# Patient Record
Sex: FEMALE | Race: WHITE | NOT HISPANIC OR LATINO | Employment: FULL TIME | URBAN - METROPOLITAN AREA
[De-identification: names, ages, dates, MRNs, and addresses within clinical notes are randomized per-mention and may not be internally consistent; named-entity substitution may affect disease eponyms.]

---

## 2021-12-01 ENCOUNTER — OFFICE VISIT (OUTPATIENT)
Dept: NEUROLOGY | Facility: CLINIC | Age: 51
End: 2021-12-01

## 2021-12-01 VITALS
TEMPERATURE: 97.3 F | RESPIRATION RATE: 14 BRPM | HEART RATE: 60 BPM | OXYGEN SATURATION: 99 % | WEIGHT: 180 LBS | BODY MASS INDEX: 27.28 KG/M2 | SYSTOLIC BLOOD PRESSURE: 130 MMHG | DIASTOLIC BLOOD PRESSURE: 72 MMHG | HEIGHT: 68 IN

## 2021-12-01 DIAGNOSIS — G43.709 CHRONIC MIGRAINE WITHOUT AURA WITHOUT STATUS MIGRAINOSUS, NOT INTRACTABLE: ICD-10-CM

## 2021-12-01 DIAGNOSIS — R42 DIZZINESS: Primary | ICD-10-CM

## 2021-12-01 PROBLEM — N83.292 COMPLEX CYST OF LEFT OVARY: Status: ACTIVE | Noted: 2017-09-11

## 2021-12-01 PROBLEM — E78.5 HYPERLIPIDEMIA: Status: ACTIVE | Noted: 2021-12-01

## 2021-12-01 PROBLEM — J30.2 SEASONAL ALLERGIES: Status: ACTIVE | Noted: 2021-06-15

## 2021-12-01 PROCEDURE — 99244 OFF/OP CNSLTJ NEW/EST MOD 40: CPT | Performed by: NURSE PRACTITIONER

## 2021-12-01 RX ORDER — SUMATRIPTAN 50 MG/1
50 TABLET, FILM COATED ORAL AS NEEDED
COMMUNITY
End: 2021-12-07 | Stop reason: ALTCHOICE

## 2021-12-01 RX ORDER — LORATADINE 10 MG/1
10 TABLET ORAL DAILY
COMMUNITY

## 2021-12-01 RX ORDER — FLUTICASONE PROPIONATE 50 MCG
2 SPRAY, SUSPENSION (ML) NASAL AS NEEDED
COMMUNITY

## 2021-12-01 RX ORDER — LISINOPRIL 20 MG/1
40 TABLET ORAL DAILY
COMMUNITY
End: 2022-10-04 | Stop reason: DRUGHIGH

## 2021-12-01 RX ORDER — MONTELUKAST SODIUM 10 MG/1
10 TABLET ORAL NIGHTLY
COMMUNITY

## 2021-12-01 RX ORDER — ATORVASTATIN CALCIUM 20 MG/1
20 TABLET, FILM COATED ORAL DAILY
COMMUNITY
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
## 2021-12-01 NOTE — ASSESSMENT & PLAN NOTE
Headaches are unchanged.  Medication changes per orders.     MRI Brain w/wo to R/O damage s/p trauma     CTA H/N due to dizziness and worsening of pain with exertion     Start Ubrelvy 100 mg PO PRN for migraine, may repeat once in 2 hours if needed.     Stop Sumatriptan, due to potential vascular cause of headache and HTN triptans contraindicated.     F/U in 6 weeks or sooner if needed

## 2021-12-06 ENCOUNTER — SPECIALTY PHARMACY (OUTPATIENT)
Dept: ONCOLOGY | Facility: HOSPITAL | Age: 51
End: 2021-12-06

## 2021-12-07 NOTE — PROGRESS NOTES
Specialty Pharmacy Refill Coordination Note     Hiral is a 51 y.o. female contacted today regarding refills of  Ubrelvy specialty medication(s).    Reviewed and verified with patient: Allergies  Meds  Problems       Specialty medication(s) and dose(s) confirmed: yes    Refill Questions      Most Recent Value   Changes to allergies? No   Changes to medications? No   New conditions since last clinic visit No   Unplanned office visit, urgent care, ED, or hospital admission in the last 4 weeks  No   How does patient/caregiver feel medication is working? Good   Financial problems or insurance changes  No   How many doses of your specialty medications were missed in the last 4 weeks? prn medication          Delivery Questions      Most Recent Value   Delivery method [employer]   Delivery address correct? Yes   Preferred delivery time? Anytime   Number of medications in delivery 1   Medication being filled and delivered Ubrelvy   Doses left of specialty medications Pt has one sample left   Is there any medication that is due not being filled? No   Supplies needed? No supplies needed   Cooler needed? No   Do any medications need mixed or dated? No   Copay form of payment Payment plan already set up   Questions or concerns for the pharmacist? No   Are any medications first time fills? No            Medication Adherence    Any gaps in refill history greater than 2 weeks in the last 3 months: no  Demonstrates understanding of importance of adherence: yes  Informant: patient  Reliability of informant: reliable  Provider-estimated medication adherence level: %   Other non-adherence reason: prn medication   Support network for adherence: healthcare provider          Follow-up: 28 day(s)     Lynda Gillette, PharmD  12/7/2021/  12:06 EST

## 2021-12-07 NOTE — PROGRESS NOTES
Specialty Pharmacy Patient Management Program  Neurology Initial Assessment       Hiral Easley is a 51 y.o. female with mirgaines seen by a Neurology provider and enrolled in the Neurology Patient Management program offered by [facility].  An initial outreach was conducted, including assessment of therapy appropriateness and specialty medication education for Ubrelvy. The patient was introduced to services offered by [facility], including: regular assessments, refill coordination, curbside pick-up or mail order delivery options, prior authorization maintenance, and financial assistance programs as applicable. The patient was also provided with contact information for the pharmacy team.     Insurance Coverage & Financial Support  [employer]    Relevant Past Medical History and Comorbidities  Relevant medical history and concomitant health conditions were discussed with the patient. The patient's chart has been reviewed for relevant past medical history and comorbid health conditions and updated as necessary.   Past Medical History:   Diagnosis Date   • Anxiety    • Dizziness    • Environmental allergies    • Hypertension    • Migraine    • Stomach problems      Social History     Socioeconomic History   • Marital status: Single   Tobacco Use   • Smoking status: Never Smoker   • Smokeless tobacco: Never Used   Vaping Use   • Vaping Use: Never used   Substance and Sexual Activity   • Alcohol use: Yes     Alcohol/week: 6.0 - 8.0 standard drinks     Types: 6 - 8 Glasses of wine per week       Allergies  Known allergies and reactions were discussed with the patient. The patient's chart has been reviewed for  allergy information and updated as necessary.   Patient has no known allergies.    Current Medication List  This medication list has been reviewed with the patient and evaluated for any interactions or necessary modifications/recommendations, and updated to  include all prescription medications, OTC medications, and supplements the patient is currently taking.  This list reflects what is contained in the patient's profile, which has also been marked as reviewed to communicate to other providers it is the most up to date version of the patient's current medication therapy.     Current Outpatient Medications:   •  atorvastatin (LIPITOR) 20 MG tablet, Take 20 mg by mouth Daily., Disp: , Rfl:   •  fluticasone (FLONASE) 50 MCG/ACT nasal spray, 2 sprays into the nostril(s) as directed by provider As Needed for Rhinitis., Disp: , Rfl:   •  lisinopril (PRINIVIL,ZESTRIL) 20 MG tablet, Take 20 mg by mouth Daily., Disp: , Rfl:   •  loratadine (Claritin) 10 MG tablet, Take 10 mg by mouth Daily., Disp: , Rfl:   •  montelukast (SINGULAIR) 10 MG tablet, Take 10 mg by mouth Every Night., Disp: , Rfl:   •  ubrogepant (ubrogepant) 100 MG tablet, Take 100 mg by mouth Daily As Needed., Disp: , Rfl:   •  SUMAtriptan (IMITREX) 50 MG tablet, Take 50 mg by mouth As Needed for Migraine. Take one tablet at onset of headache. May repeat dose one time in 2 hours if headache not relieved., Disp: , Rfl:     Drug Interactions  None noted    Recommended Medications Assessment: none      Relevant Laboratory Values    Labs monitoring not required    Initial Education Provided for Specialty Medication  The patient has been provided with the following education and any applicable administration techniques (i.e. self-injection) have been demonstrated for the therapies indicated. All questions and concerns have been addressed prior to the patient receiving the medication, and the patient has verbalized understanding of the education and any materials provided.  Additional patient education shall be provided and documented upon request by the patient, provider or payer.      Ubrelvy (Ubrogepant) 100mg daily prn migraines - may repeat once in 2 hours if needed.  Medication Expectations   Why am I taking this  medication? You are taking this medication to treat acute migraines.   What should I expect while on this medication? You should expect to see a decrease in the frequency and severity of your migraines.   How does the medication work? Ubrelvy is a monoclonal antibody that binds to calcitonin gene-related peptide (CGRP) and blocks its binding to the receptor decreasing the severity of migraines.   How long will I be on this medication for? The amount of time you will be on this medication will be determined by your doctor and your response to the medication.    How do I take this medication? Take as directed on your prescription label.  Reviewed plan for Ubrelvy 100mg (1 tablet) PO daily prn; may repeat x 1 in 2 hours, if needed. Max dosage = 200mg/24 hours.    What are some possible side effects? Potential side effects including, but not limited to nausea and somnolence. Pt verbalized understanding.   What happens if I miss a dose? This is taken as needed for migraines.     Medication Safety   What are things I should warn my doctor immediately about? Allergic reaction: Itching or hives, swelling in your face or hands, swelling or tingling in your mouth or throat, chest tightness, trouble breathing     What are things that I should be cautious of? Tell your doctor if you are pregnant or breastfeeding, or if you have kidney disease or liver disease.   What are some medications that can interact with this one? Concomitant use of strong CY inhibitors, check with your pharmacist or provider before starting any new medications, avoid grapefruit juice.     Medication Storage/Handling   How should I handle this medication? Keep this medication out of reach of pets/children.   How does this medication need to be stored? Store at a controlled room temperature between 20 and 25 degrees C (68 and 77 degrees F), with excursions permitted between 15 and 30 degrees C (59 and 86 degrees F) away from direct sunlight and  moisture.   How should I dispose of this medication? There should not be a need to dispose of this medication unless your provider decides to change the dose or therapy. If that is the case, take to your local police station for proper disposal. Some pharmacies also have take-back bins for medication drop-off.      Resources/Support   How can I remind myself to take this medication? This is taken as needed for migraines.   Is financial support available?  Yes, [employer] can provide co-pay cards if you have commercial insurance or patient assistance if you have Medicare or no insurance.    Which vaccines are recommended for me? Talk to your doctor about these vaccines: Flu, Coronavirus (COVID-19), Pneumococcal (pneumonia), Tdap, Hepatitis B, Zoster (shingles)          Adherence and Self-Administration  • Barriers to Patient Adherence and/or Self-Administration: none  • Methods for Supporting Patient Adherence and/or Self-Administration: none    Goals of Therapy  • Patient Goals of Therapy: Symptom management   • Clinical Goals or Therapeutic Targets, If Applicable: reduction of severity of migraines      Quality of Life Assessment   The patient's current (pre-therapy) quality of life was discussed with the patient. The QOL segment of this outreach has been reviewed and updated.   • Quality of Life Score: 7    Reassessment Plan & Follow-Up  1. Pharmacist to perform regular reassessments no more than (6) months from the previous assessment.  2. Care Coordinator to set up future refill outreaches, coordinate prescription delivery, and escalate clinical questions to pharmacist.     Additional Plans, Therapy Recommendations or Therapy Problems to Be Addressed:  Patient to stop taking sumatriptan due to potential vascular cause of headache and hypertension.  Recent Provider Changes:  New to our neurology group.    Attestation  I attest that the initiated specialty Ubrelvy appropriate for the patient based on  my assessment.  If the prescribed therapy is at any point deemed not appropriate based on the current or future assessments, a consultation will be initiated with the patient's specialty care provider to determine the best course of action. The revised plan of therapy will be documented along with any additional patient education provided.     Lynda Gillette, PharmD  12/7/2021  12:06 EST

## 2021-12-30 ENCOUNTER — HOSPITAL ENCOUNTER (OUTPATIENT)
Dept: MRI IMAGING | Facility: HOSPITAL | Age: 51
Discharge: HOME | End: 2021-12-30

## 2021-12-30 ENCOUNTER — HOSPITAL ENCOUNTER (OUTPATIENT)
Dept: CT IMAGING | Facility: HOSPITAL | Age: 51
Discharge: HOME | End: 2021-12-30

## 2021-12-30 ENCOUNTER — APPOINTMENT (OUTPATIENT)
Dept: CT IMAGING | Facility: HOSPITAL | Age: 51
End: 2021-12-30

## 2021-12-30 DIAGNOSIS — R42 DIZZINESS: ICD-10-CM

## 2021-12-30 DIAGNOSIS — G43.709 CHRONIC MIGRAINE WITHOUT AURA WITHOUT STATUS MIGRAINOSUS, NOT INTRACTABLE: ICD-10-CM

## 2021-12-30 LAB — CREAT BLDA-MCNC: 0.8 MG/DL (ref 0.6–1.3)

## 2021-12-30 PROCEDURE — 0 GADOBENATE DIMEGLUMINE 529 MG/ML SOLUTION: Performed by: NURSE PRACTITIONER

## 2021-12-30 PROCEDURE — 70496 CT ANGIOGRAPHY HEAD: CPT

## 2021-12-30 PROCEDURE — 0 IOPAMIDOL PER 1 ML: Performed by: NURSE PRACTITIONER

## 2021-12-30 PROCEDURE — 70553 MRI BRAIN STEM W/O & W/DYE: CPT

## 2021-12-30 PROCEDURE — A9577 INJ MULTIHANCE: HCPCS | Performed by: NURSE PRACTITIONER

## 2021-12-30 PROCEDURE — 70498 CT ANGIOGRAPHY NECK: CPT

## 2021-12-30 PROCEDURE — 82565 ASSAY OF CREATININE: CPT

## 2021-12-30 RX ADMIN — GADOBENATE DIMEGLUMINE 15 ML: 529 INJECTION, SOLUTION INTRAVENOUS at 09:57

## 2021-12-30 RX ADMIN — IOPAMIDOL 75 ML: 755 INJECTION, SOLUTION INTRAVENOUS at 09:03

## 2022-01-04 ENCOUNTER — SPECIALTY PHARMACY (OUTPATIENT)
Dept: ONCOLOGY | Facility: HOSPITAL | Age: 52
End: 2022-01-04

## 2022-01-04 ENCOUNTER — TELEPHONE (OUTPATIENT)
Dept: NEUROLOGY | Facility: CLINIC | Age: 52
End: 2022-01-04

## 2022-01-04 NOTE — TELEPHONE ENCOUNTER
----- Message from Anna Pretty DNP, APRN sent at 1/3/2022  5:02 PM EST -----  Please notify pt that MRI brain and CT head and neck are normal with no concerning findings.

## 2022-01-04 NOTE — TELEPHONE ENCOUNTER
Caller: TONIE Ramon call back number: [phone]    Who are you requesting to speak with (clinical staff, provider,  specific staff member): RIYA    What was the call regarding: PT IS RETURNING RIYA'S CALL    Do you require a callback: YES, PLEASE

## 2022-01-04 NOTE — PROGRESS NOTES
Specialty Pharmacy Refill Coordination Note     Hiral is a 51 y.o. female contacted today regarding refills of  Ubrelvy specialty medication(s). Patient did not need medication refilled at this time. Plan to f/u in 1 month.    Reviewed and verified with patient: Yes  Specialty medication(s) and dose(s) confirmed: yes              Medication Adherence    Support network for adherence: healthcare provider          Follow-up: Plan to reach out to patient around 2/3/2022 for a refill.     Catrina Mejia, Pharmacy Technician  Specialty Pharmacy Technician

## 2022-01-28 ENCOUNTER — SPECIALTY PHARMACY (OUTPATIENT)
Dept: ONCOLOGY | Facility: HOSPITAL | Age: 52
End: 2022-01-28

## 2022-02-10 ENCOUNTER — OFFICE VISIT (OUTPATIENT)
Dept: NEUROLOGY | Facility: CLINIC | Age: 52
End: 2022-02-10

## 2022-02-10 VITALS
HEIGHT: 68 IN | WEIGHT: 183 LBS | HEART RATE: 55 BPM | BODY MASS INDEX: 27.74 KG/M2 | OXYGEN SATURATION: 99 % | SYSTOLIC BLOOD PRESSURE: 126 MMHG | DIASTOLIC BLOOD PRESSURE: 78 MMHG

## 2022-02-10 DIAGNOSIS — G43.709 CHRONIC MIGRAINE WITHOUT AURA WITHOUT STATUS MIGRAINOSUS, NOT INTRACTABLE: Primary | Chronic | ICD-10-CM

## 2022-02-10 PROCEDURE — 99213 OFFICE O/P EST LOW 20 MIN: CPT | Performed by: NURSE PRACTITIONER

## 2022-02-10 NOTE — ASSESSMENT & PLAN NOTE
Headaches are improving with treatment.  Continue current treatment regimen.     Stable on Ubrelvy 100 mg PO PRN     F/U in 1 year or sooner if needed
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
## 2022-02-23 ENCOUNTER — SPECIALTY PHARMACY (OUTPATIENT)
Dept: ONCOLOGY | Facility: HOSPITAL | Age: 52
End: 2022-02-23

## 2022-02-23 NOTE — PROGRESS NOTES
Specialty Pharmacy Refill Coordination Note     Hiral is a 51 y.o. female contacted today regarding refills of  Qulipta specialty medication(s). Patient did not need refilled at this time.    Reviewed and verified with patient: Yes  Specialty medication(s) and dose(s) confirmed: yes             Follow-up: Plan to reach out to patient around 3/25 for a refill.     Catrina Mejia, Pharmacy Technician  Specialty Pharmacy Technician

## 2022-03-25 ENCOUNTER — SPECIALTY PHARMACY (OUTPATIENT)
Dept: ONCOLOGY | Facility: HOSPITAL | Age: 52
End: 2022-03-25

## 2022-03-25 NOTE — PROGRESS NOTES
Specialty Pharmacy Refill Coordination Note     Hiral is a 52 y.o. female contacted today regarding refills of  Ubrelvy specialty medication(s).   Patient did not need refilled at this time.    Reviewed and verified with patient:         Specialty medication(s) and dose(s) confirmed: yes             Follow-up: Plan to reach out to patient around 4/24 for a refill.       Catrina Mejia, Pharmacy Technician  Specialty Pharmacy Technician

## 2022-04-22 ENCOUNTER — SPECIALTY PHARMACY (OUTPATIENT)
Dept: ONCOLOGY | Facility: HOSPITAL | Age: 52
End: 2022-04-22

## 2022-04-22 NOTE — PROGRESS NOTES
Specialty Pharmacy Refill Coordination Note     Hiral is a 52 y.o. female contacted today regarding refills of  Ubrelvy specialty medication(s). Patient did not need refilled at this time.    Reviewed and verified with patient:         Specialty medication(s) and dose(s) confirmed: yes             Follow-up: 1 month(s)     Catrina Mejia, Pharmacy Technician  Specialty Pharmacy Technician

## 2022-04-25 ENCOUNTER — SPECIALTY PHARMACY (OUTPATIENT)
Dept: ONCOLOGY | Facility: HOSPITAL | Age: 52
End: 2022-04-25

## 2022-04-25 NOTE — PROGRESS NOTES
Specialty Pharmacy Patient Management Program  Neurology Reassessment     Hiral Easley is a 52 y.o. female with migraines seen by a Neurology provider and enrolled in the Neurology Patient Management program offered by [facility].  A follow-up outreach was conducted, including assessment of continued therapy appropriateness, medication adherence, and side effect incidence and management for  Ubrelvy.     Changes to Insurance Coverage or Financial Support  Rx [employer]     Relevant Past Medical History and Comorbidities  Relevant medical history and concomitant health conditions were discussed with the patient. The patient's chart has been reviewed for relevant past medical history and comorbid health conditions and updated as necessary.   Past Medical History:   Diagnosis Date   • Anxiety    • Dizziness    • Environmental allergies    • Hypertension    • Migraine    • Stomach problems      Social History     Socioeconomic History   • Marital status: Single   Tobacco Use   • Smoking status: Never Smoker   • Smokeless tobacco: Never Used   Vaping Use   • Vaping Use: Never used   Substance and Sexual Activity   • Alcohol use: Yes     Alcohol/week: 6.0 - 8.0 standard drinks     Types: 6 - 8 Glasses of wine per week       Problem list reviewed by Lynda Gillette, Cheryl on 4/25/2022 at 11:35 AM    Allergies  Known allergies and reactions were discussed with the patient. The patient's chart has been reviewed for allergy information and updated as necessary.   Patient has no known allergies.    Allergies reviewed by Lynda Gillette PharmD on 4/25/2022 at 11:35 AM    Relevant Laboratory Values    Common labs    Common Labsle 12/30/21   Creatinine 0.80      Comments are available for some flowsheets but are not being displayed.               Current Medication List  This medication list has been reviewed with the patient and evaluated for any interactions or necessary  modifications/recommendations, and updated to include all prescription medications, OTC medications, and supplements the patient is currently taking.  This list reflects what is contained in the patient's profile, which has also been marked as reviewed to communicate to other providers it is the most up to date version of the patient's current medication therapy.     Current Outpatient Medications:   •  atorvastatin (LIPITOR) 20 MG tablet, Take 20 mg by mouth Daily., Disp: , Rfl:   •  fluticasone (FLONASE) 50 MCG/ACT nasal spray, 2 sprays into the nostril(s) as directed by provider As Needed for Rhinitis., Disp: , Rfl:   •  lisinopril (PRINIVIL,ZESTRIL) 20 MG tablet, Take 20 mg by mouth Daily., Disp: , Rfl:   •  loratadine (CLARITIN) 10 MG tablet, Take 10 mg by mouth Daily., Disp: , Rfl:   •  montelukast (SINGULAIR) 10 MG tablet, Take 10 mg by mouth Every Night., Disp: , Rfl:   •  ubrogepant (ubrogepant) 100 MG tablet, Take 1 tablet by mouth Daily As Needed (migraines - may repeat x 1 in 2 hours (max of 200mg /24 hours))., Disp: 30 tablet, Rfl: 11    Medicines reviewed by Lynda Gillette, PharmD on 4/25/2022 at 11:35 AM    Drug Interactions  none     Adverse Drug Reactions  • Adverse Reactions Experienced: none   • Plan for ADR Management: not required    Hospitalizations and Urgent Care Since Last Assessment  • Hospitalizations or Admissions: none   • ED Visits: none  • Urgent Office Visits: none     Adherence and Self-Administration  Adherence related patient's specialty therapy was discussed with the patient. The Adherence segment of this outreach has been reviewed and updated.     Medication Adherence    Demonstrates understanding of importance of adherence: yes  Informant: patient  Reliability of informant: reliable  Estimated medication adherence level: %  Adherence estimation source: claims history  Reasons for non-adherence: no problems identified  Adherence tools used: directed education   Other  adherence tools: prn medication   Support network for adherence: healthcare provider        • Ongoing or New Barriers to Patient Adherence and/or Self-Administration: none   • Methods for Supporting Patient Adherence and/or Self-Administration: none     Goals of Therapy  Goals related to the patient's specialty therapy was discussed with the patient. The Patient Goals segment of this outreach has been reviewed and updated.    Goals     • Specialty Pharmacy General Goal      Reduction in  severity and length of acute migraine symptoms.  2/10/22 HA frequency has decreased to approximately 5 per month. Ubrelvy is abortive without side effects. Has not had to take a second dosage               Quality of Life Assessment   Quality of Life related to the patient's specialty therapy was discussed with the patient. The QOL segment of this outreach has been reviewed and updated.     Quality of Life Assessment  Quality of Life Improvement Scale: Moderately better    Reassessment Plan & Follow-Up  1. Medication Therapy Changes: none  2. Additional Plans, Therapy Recommendations, or Therapy Problems to Be Addressed: none  3. Pharmacist to perform regular reassessments no more than (6) months from the previous assessment.  4. Care Coordinator to set up future refill outreaches, coordinate prescription delivery, and escalate clinical questions to pharmacist.     Attestation  I attest that the specialty medication(s) addressed above are appropriate for the patient based on my reassessment.  If the prescribed therapy is at any point deemed not appropriate based on the current or future assessments, a consultation will be initiated with the patient's specialty care provider to determine the best course of action. The revised plan of therapy will be documented along with any additional patient education provided.     Lynda Gillette, PharmD  4/25/2022  11:41 EDT

## 2022-05-09 ENCOUNTER — TELEPHONE (OUTPATIENT)
Dept: NEUROLOGY | Facility: OTHER | Age: 52
End: 2022-05-09

## 2022-05-12 ENCOUNTER — SPECIALTY PHARMACY (OUTPATIENT)
Dept: ONCOLOGY | Facility: HOSPITAL | Age: 52
End: 2022-05-12

## 2022-05-12 NOTE — PROGRESS NOTES
Specialty Pharmacy Refill Coordination Note     Hiral is a 52 y.o. female contacted today regarding refills of  Ubrelvy specialty medication(s).    Reviewed and verified with patient:         Specialty medication(s) and dose(s) confirmed: yes    Refill Questions    Flowsheet Row Most Recent Value   Changes to allergies? No   Changes to medications? No   New conditions since last clinic visit No   Unplanned office visit, urgent care, ED, or hospital admission in the last 4 weeks  No   How does patient/caregiver feel medication is working? Excellent   Financial problems or insurance changes  No   Since the previous refill, were any specialty medication doses or scheduled injections missed or delayed?  No  [prn medication]   Does this patient require a clinical escalation to a pharmacist? No          Delivery Questions    Flowsheet Row Most Recent Value   Delivery method FedEx   Delivery address correct? Yes   Preferred delivery time? Anytime   Number of medications in delivery 1   Medication being filled and delivered Ubrelvy   Is there any medication that is due not being filled? No   Supplies needed? No supplies needed   Cooler needed? No   Do any medications need mixed or dated? No   Copay form of payment Payment plan already set up   Questions or concerns for the pharmacist? No   Are any medications first time fills? No            Medication Adherence    Adherence tools used: directed education   Other adherence tool: prn medication   Support network for adherence: healthcare provider          Follow-up: 1 month(s)     Catrina Mejia, Pharmacy Technician  Specialty Pharmacy Technician

## 2022-06-08 ENCOUNTER — SPECIALTY PHARMACY (OUTPATIENT)
Dept: ONCOLOGY | Facility: HOSPITAL | Age: 52
End: 2022-06-08

## 2022-06-08 NOTE — PROGRESS NOTES
Specialty Pharmacy Refill Coordination Note     Hiral is a 52 y.o. female contacted today regarding refills of  Ubrelvy specialty medication(s).    Reviewed and verified with patient:         Specialty medication(s) and dose(s) confirmed: yes    Refill Questions    Flowsheet Row Most Recent Value   Changes to allergies? No   Changes to medications? No   New conditions since last clinic visit No   Unplanned office visit, urgent care, ED, or hospital admission in the last 4 weeks  No   How does patient/caregiver feel medication is working? Excellent   Financial problems or insurance changes  No   If yes, describe changes in insurance or financial issues. N/A   Since the previous refill, were any specialty medication doses or scheduled injections missed or delayed?  No   If yes, please provide the amount N/A   Why were doses missed? N/A   Does this patient require a clinical escalation to a pharmacist? No          Delivery Questions    Flowsheet Row Most Recent Value   Delivery method FedEx   Delivery address correct? Yes   Preferred delivery time? Anytime   Number of medications in delivery 1   Medication being filled and delivered Ubrelvy   Doses left of specialty medications 0   Is there any medication that is due not being filled? No   Supplies needed? No supplies needed   Cooler needed? No   Do any medications need mixed or dated? No   Copay form of payment Payment plan already set up   Questions or concerns for the pharmacist? No   Are any medications first time fills? No            Medication Adherence    Adherence tools used: directed education   Other adherence tool: prn medication   Support network for adherence: healthcare provider          Follow-up: 1 month(s)     Catrina Mejia, Pharmacy Technician  Specialty Pharmacy Technician

## 2022-07-13 ENCOUNTER — SPECIALTY PHARMACY (OUTPATIENT)
Dept: ONCOLOGY | Facility: HOSPITAL | Age: 52
End: 2022-07-13

## 2022-07-13 RX ORDER — AMLODIPINE BESYLATE 5 MG/1
5 TABLET ORAL DAILY
COMMUNITY

## 2022-07-13 NOTE — PROGRESS NOTES
Specialty Pharmacy Refill Coordination Note     Hiral is a 52 y.o. female contacted today regarding refills of  Ubrelvy specialty medication(s).    Reviewed and verified with patient:         Specialty medication(s) and dose(s) confirmed: yes    Refill Questions    Flowsheet Row Most Recent Value   Changes to allergies? No   Changes to medications? Yes  [Patient has increased lisinopril from 20mg to 40mg daily. She has also started amlodipine 5mg daily.]   New conditions since last clinic visit No   Unplanned office visit, urgent care, ED, or hospital admission in the last 4 weeks  No   How does patient/caregiver feel medication is working? Fair  [Patient does not think the medication is working as well as it was. She is scheduling an appointment to be seen for possible changes.]   Financial problems or insurance changes  No   If yes, describe changes in insurance or financial issues. N/A   Since the previous refill, were any specialty medication doses or scheduled injections missed or delayed?  No  [prn medication]   If yes, please provide the amount N/A   Why were doses missed? N/A   Does this patient require a clinical escalation to a pharmacist? Yes          Delivery Questions    Flowsheet Row Most Recent Value   Delivery method FedEx   Delivery address correct? Yes   Preferred delivery time? Anytime   Number of medications in delivery 1   Medication being filled and delivered Ubrelvy   Is there any medication that is due not being filled? No   Supplies needed? No supplies needed   Cooler needed? No   Do any medications need mixed or dated? No   Copay form of payment Payment plan already set up   Questions or concerns for the pharmacist? No   Are any medications first time fills? No            Medication Adherence    Adherence tools used: directed education   Other adherence tool: prn medication   Support network for adherence: healthcare provider          Follow-up: 1 month(s)     Catrina Mejia, Pharmacy  Technician  Specialty Pharmacy Technician

## 2022-07-13 NOTE — PROGRESS NOTES
Refill outreach escalated to pharmacist:  Patient has increased lisinopril 20mg to 40mg daily, and started amlodipine 5mg  po daily. Medications added to patient's profile and DUR ran - ok to proceed with Ubrelvy refill.  She does not think the Ubrelvy is not as effective as when she first started.   Provider notified and wants patient to schedule an appointment for possible medication change/medication addition.  F/U in 1 week to see if provider appointment scheduled.  Lynda Gillette, PharmD.  7/13/2022

## 2022-08-08 ENCOUNTER — SPECIALTY PHARMACY (OUTPATIENT)
Dept: ONCOLOGY | Facility: HOSPITAL | Age: 52
End: 2022-08-08

## 2022-08-08 NOTE — PROGRESS NOTES
Specialty Pharmacy Refill Coordination Note     Hiral is a 52 y.o. female contacted today regarding refills of Ubrelvy specialty medication(s).    Reviewed and verified with patient: Yes  Specialty medication(s) and dose(s) confirmed: yes    Refill Questions    Flowsheet Row Most Recent Value   Changes to allergies? No   Changes to medications? No   New conditions since last clinic visit No   Unplanned office visit, urgent care, ED, or hospital admission in the last 4 weeks  No   How does patient/caregiver feel medication is working? Good  [Patient still having several headaches a month.]   Financial problems or insurance changes  No   If yes, describe changes in insurance or financial issues. N/A   Since the previous refill, were any specialty medication doses or scheduled injections missed or delayed?  No   If yes, please provide the amount N/A   Why were doses missed? N/A   Does this patient require a clinical escalation to a pharmacist? No          Delivery Questions    Flowsheet Row Most Recent Value   Delivery method FedEx   Delivery address correct? Yes   Preferred delivery time? Anytime   Number of medications in delivery 1   Medication being filled and delivered Ubrelvy   Doses left of specialty medications About 7 tablets. PRN medication.   Is there any medication that is due not being filled? No   Supplies needed? No supplies needed   Cooler needed? No   Do any medications need mixed or dated? No   Copay form of payment Credit card on file   Questions or concerns for the pharmacist? No   Are any medications first time fills? No            Medication Adherence    Adherence tools used: directed education   Other adherence tool: prn medication   Support network for adherence: healthcare provider          Follow-up:  28 days.     Maria Luisa Villa, Pharmacy Technician  Specialty Pharmacy Technician

## 2022-08-25 ENCOUNTER — DOCUMENTATION (OUTPATIENT)
Dept: ONCOLOGY | Facility: HOSPITAL | Age: 52
End: 2022-08-25

## 2022-09-09 ENCOUNTER — SPECIALTY PHARMACY (OUTPATIENT)
Dept: ONCOLOGY | Facility: HOSPITAL | Age: 52
End: 2022-09-09

## 2022-09-09 NOTE — PROGRESS NOTES
Specialty Pharmacy Refill Coordination Note     Hiral is a 52 y.o. female contacted today regarding refills of Ubrelvy specialty medication(s).    Reviewed and verified with patient: Yes  Specialty medication(s) and dose(s) confirmed: yes    Refill Questions    Flowsheet Row Most Recent Value   Changes to allergies? No   Changes to medications? No   New conditions since last clinic visit No   Unplanned office visit, urgent care, ED, or hospital admission in the last 4 weeks  No   How does patient/caregiver feel medication is working? Very good   Financial problems or insurance changes  No   If yes, describe changes in insurance or financial issues. N/A   Since the previous refill, were any specialty medication doses or scheduled injections missed or delayed?  No   If yes, please provide the amount N/A   Why were doses missed? N/A   Does this patient require a clinical escalation to a pharmacist? No          Delivery Questions    Flowsheet Row Most Recent Value   Delivery method FedEx   Delivery address correct? Yes   Preferred delivery time? Anytime   Number of medications in delivery 1   Medication being filled and delivered Ubrelvy   Doses left of specialty medications About 5 tablets. PRN medication.   Is there any medication that is due not being filled? No   Supplies needed? No supplies needed   Cooler needed? No   Do any medications need mixed or dated? No   Copay form of payment Credit card on file   Questions or concerns for the pharmacist? No   Are any medications first time fills? No            Medication Adherence    Adherence tools used: directed education   Other adherence tool: prn medication   Support network for adherence: healthcare provider          Follow-up:  28 days     Maria Luisa Villa, Pharmacy Technician  Specialty Pharmacy Technician

## 2022-10-03 ENCOUNTER — SPECIALTY PHARMACY (OUTPATIENT)
Dept: ONCOLOGY | Facility: HOSPITAL | Age: 52
End: 2022-10-03

## 2022-10-03 NOTE — PROGRESS NOTES
Spoke with patient on 10/3 and she feels there have been times recently that Ubrelvy hasn't aborted migraines as effectively as it had in the past. Hold Ubrelvy refill at this time. She has an appt to see Sheng tomorrow morning and we will plan to see her in person to discuss after her appt with the provider.    Marilee Alaniz, Cheryl, MPA, BCPS, MSCS  10/3/2022   15:38 EDT

## 2022-10-04 ENCOUNTER — OFFICE VISIT (OUTPATIENT)
Dept: NEUROLOGY | Facility: CLINIC | Age: 52
End: 2022-10-04

## 2022-10-04 VITALS
OXYGEN SATURATION: 99 % | TEMPERATURE: 97.3 F | SYSTOLIC BLOOD PRESSURE: 106 MMHG | WEIGHT: 178.6 LBS | HEIGHT: 68 IN | HEART RATE: 61 BPM | BODY MASS INDEX: 27.07 KG/M2 | DIASTOLIC BLOOD PRESSURE: 82 MMHG

## 2022-10-04 DIAGNOSIS — I10 PRIMARY HYPERTENSION: ICD-10-CM

## 2022-10-04 DIAGNOSIS — G43.C0 PERIODIC HEADACHE SYNDROME, NOT INTRACTABLE: Primary | ICD-10-CM

## 2022-10-04 PROCEDURE — 99214 OFFICE O/P EST MOD 30 MIN: CPT | Performed by: NURSE PRACTITIONER

## 2022-10-04 RX ORDER — OXCARBAZEPINE 150 MG/1
150 TABLET, FILM COATED ORAL 2 TIMES DAILY
Qty: 60 TABLET | Refills: 2 | Status: SHIPPED | OUTPATIENT
Start: 2022-10-04 | End: 2023-01-04 | Stop reason: SDUPTHER

## 2022-10-04 RX ORDER — LISINOPRIL 40 MG/1
40 TABLET ORAL DAILY
COMMUNITY
Start: 2022-09-09

## 2022-10-04 RX ORDER — DICYCLOMINE HCL 20 MG
TABLET ORAL
COMMUNITY
Start: 2022-05-09

## 2022-10-04 RX ORDER — LEVOCETIRIZINE DIHYDROCHLORIDE 5 MG/1
5 TABLET, FILM COATED ORAL EVERY EVENING
COMMUNITY
End: 2023-01-04

## 2022-10-04 RX ORDER — ONDANSETRON 4 MG/1
4 TABLET, FILM COATED ORAL 3 TIMES DAILY
COMMUNITY
Start: 2022-06-29 | End: 2023-01-04

## 2022-10-04 NOTE — PROGRESS NOTES
Neuro Office Visit      Encounter Date: 10/04/2022   Patient Name: Hiral Easley  : 1970   MRN: [id number]     Chief Complaint:    Chief Complaint   Patient presents with   • Migraine       History of Present Illness: Hiral Easley is a 52 y.o. female who is here today in Neurology for headaches    Interval History  Seen in ED [facility] this summer for headache and elevated blood pressure. Treated migraine but did not treat bp.     HA  Current headaches are same as previous headaches.  Freq: can go weeks without a headache and then will have several in a row  Days per month  Location: retro-orbital and right temple  Quality: dull ache  Duration: Ubrelvy is effective in 30 min  Severity:3/10  Triggers: stress, onion  Assoc sx: Nausea, vomiting, photophobia/phonophobia, dizziness, no tinnitus  Aura:tingling and ear pain  Visual Changes    Abortives: Ubrelvy, imitrex, sumatriptan, tylenol, excedrin, ibuprofen, toradol  Preventives:none    HTN  BP now stable on norvasc and lisinopril. Compliant with meds. No side effects.    MRI Brain With & Without Contrast (2021 09:56)      PMH: ocular migraines.   Subjective      Past Medical History:   Past Medical History:   Diagnosis Date   • Anxiety    • Dizziness    • Environmental allergies    • Hypertension    • Migraine    • Stomach problems        Past Surgical History:   Past Surgical History:   Procedure Laterality Date   • GALLBLADDER SURGERY     • RETINAL DETACHMENT SURGERY         Family History:   Family History   Problem Relation Age of Onset   • Hypertension Mother    • Hyperlipidemia Mother    • Migraines Sister    • Hypertension Sister    • Hyperlipidemia Sister        Social History:   Social History     Socioeconomic History   • Marital status: Single   Tobacco Use   • Smoking status: Never Smoker   • Smokeless tobacco: Never Used   Vaping Use   • Vaping Use: Never used   Substance and Sexual Activity   • Alcohol use: Yes      Alcohol/week: 6.0 - 8.0 standard drinks     Types: 6 - 8 Glasses of wine per week       Medications:     Current Outpatient Medications:   •  amLODIPine (NORVASC) 5 MG tablet, Take 5 mg by mouth Daily., Disp: , Rfl:   •  atorvastatin (LIPITOR) 20 MG tablet, Take 20 mg by mouth Daily., Disp: , Rfl:   •  dicyclomine (BENTYL) 20 MG tablet, TAKE 1 TABLET BY MOUTH 4 TIMES DAILY AS NEEDED, Disp: , Rfl:   •  fluticasone (FLONASE) 50 MCG/ACT nasal spray, 2 sprays into the nostril(s) as directed by provider As Needed for Rhinitis., Disp: , Rfl:   •  levocetirizine (XYZAL) 5 MG tablet, Take 5 mg by mouth Every Evening., Disp: , Rfl:   •  loratadine (CLARITIN) 10 MG tablet, Take 10 mg by mouth Daily., Disp: , Rfl:   •  montelukast (SINGULAIR) 10 MG tablet, Take 10 mg by mouth Every Night., Disp: , Rfl:   •  ondansetron (ZOFRAN) 4 MG tablet, Take 4 mg by mouth 3 (Three) Times a Day., Disp: , Rfl:   •  ubrogepant (ubrogepant) 100 MG tablet, Take 1 tablet by mouth Daily As Needed (migraines - may repeat x 1 in 2 hours (max of 200mg /24 hours))., Disp: 30 tablet, Rfl: 11  •  lisinopril (PRINIVIL,ZESTRIL) 40 MG tablet, Take 40 mg by mouth Daily., Disp: , Rfl:   •  OXcarbazepine (TRILEPTAL) 150 MG tablet, Take 1 tablet by mouth 2 (Two) Times a Day., Disp: 60 tablet, Rfl: 2    Allergies:   No Known Allergies    PHQ-9 Total Score:     STEADI Fall Risk Assessment has not been completed.    Objective     Physical Exam:   Physical Exam  Eyes:      Pupils: Pupils are equal, round, and reactive to light.   Neurological:      Mental Status: She is oriented to person, place, and time.      Gait: Gait is intact.      Deep Tendon Reflexes:      Reflex Scores:       Tricep reflexes are 2+ on the right side and 2+ on the left side.       Bicep reflexes are 2+ on the right side and 2+ on the left side.       Brachioradialis reflexes are 2+ on the right side and 2+ on the left side.       Patellar reflexes are 2+ on the right side and 2+ on the  "left side.       Achilles reflexes are 2+ on the right side and 2+ on the left side.  Psychiatric:         Speech: Speech normal.         Neurologic Exam     Mental Status   Oriented to person, place, and time.   Follows 3 step commands.   Attention: normal. Concentration: normal.   Speech: speech is normal   Level of consciousness: alert  Knowledge: consistent with education.   Normal comprehension.     Cranial Nerves     CN III, IV, VI   Pupils are equal, round, and reactive to light.  Right pupil: Accommodation: intact.   Left pupil: Accommodation: intact.   CN III: no CN III palsy  CN VI: no CN VI palsy  Nystagmus: none   Diplopia: none  Upgaze: normal  Downgaze: normal  Conjugate gaze: present    CN VII   Facial expression full, symmetric.     CN VIII   Hearing: intact    CN XII   CN XII normal.     Motor Exam   Muscle bulk: normal  Overall muscle tone: normal    Strength   Right biceps: 5/5  Left biceps: 5/5  Right triceps: 5/5  Left triceps: 5/5  Right interossei: 5/5  Left interossei: 5/5  Right quadriceps: 5/5  Left quadriceps: 5/5  Right anterior tibial: 5/5  Left anterior tibial: 5/5  Right posterior tibial: 5/5  Left posterior tibial: 5/5    Sensory Exam   Light touch normal.     Gait, Coordination, and Reflexes     Gait  Gait: normal    Tremor   Resting tremor: absent  Action tremor: absent    Reflexes   Right brachioradialis: 2+  Left brachioradialis: 2+  Right biceps: 2+  Left biceps: 2+  Right triceps: 2+  Left triceps: 2+  Right patellar: 2+  Left patellar: 2+  Right achilles: 2+  Left achilles: 2+  Right : 2+  Left : 2+       Vital Signs:   Vitals:    10/04/22 1126   BP: 106/82   Pulse: 61   Temp: 97.3 °F (36.3 °C)   SpO2: 99%   Weight: 81 kg (178 lb 9.6 oz)   Height: 172.7 cm (67.99\")     Body mass index is 27.16 kg/m².         Assessment / Plan      Assessment/Plan:   Diagnoses and all orders for this visit:    1. Periodic headache syndrome, not intractable (Primary)  Comments:  Cont " Ubrelvy prn. Will need sodium labs.   Orders:  -     OXcarbazepine (TRILEPTAL) 150 MG tablet; Take 1 tablet by mouth 2 (Two) Times a Day.  Dispense: 60 tablet; Refill: 2    2. Primary hypertension  Comments:  Monitor bp at home. Cont lisinopril and norvasc.           Patient Education:     Reviewed medications, potential side effects and signs and symptoms to report. Discussed risk versus benefits of treatment plan with patient and/or family-including medications, labs and radiology that may be ordered. Addressed questions and concerns during visit. Patient and/or family verbalized understanding and agree with plan. Instructed to call the office with any questions and report to ER with any life-threatening symptoms.     Follow Up:   Return in about 3 months (around 1/4/2023).    During this visit the following were done:  Labs Reviewed []    Labs Ordered []    Radiology Reports Reviewed []    Radiology Ordered []    PCP Records Reviewed []    Referring Provider Records Reviewed []    ER Records Reviewed []    Hospital Records Reviewed []    History Obtained From Family []    Radiology Images Reviewed [x]    Other Reviewed [x]    Records Requested []      Anna Pretty, DNP, APRN

## 2022-10-05 ENCOUNTER — SPECIALTY PHARMACY (OUTPATIENT)
Dept: ONCOLOGY | Facility: HOSPITAL | Age: 52
End: 2022-10-05

## 2022-10-05 NOTE — PROGRESS NOTES
Specialty Pharmacy Patient Management Program  Neurology Reassessment     Hiral Easley is a 52 y.o. female with migraines seen by a Neurology provider and enrolled in the Neurology Patient Management program offered by [facility].  A follow-up outreach was conducted, including assessment of continued therapy appropriateness, medication adherence, and side effect incidence and management for  Ubrelvy.     Changes to Insurance Coverage or Financial Support  Rx [employer], Ubrelvy co-pay card.     Relevant Past Medical History and Comorbidities  Relevant medical history and concomitant health conditions were discussed with the patient. The patient's chart has been reviewed for relevant past medical history and comorbid health conditions and updated as necessary.   Past Medical History:   Diagnosis Date   • Anxiety    • Dizziness    • Environmental allergies    • Hypertension    • Migraine    • Stomach problems      Social History     Socioeconomic History   • Marital status: Single   Tobacco Use   • Smoking status: Never Smoker   • Smokeless tobacco: Never Used   Vaping Use   • Vaping Use: Never used   Substance and Sexual Activity   • Alcohol use: Yes     Alcohol/week: 6.0 - 8.0 standard drinks     Types: 6 - 8 Glasses of wine per week       Problem list reviewed by Lynda Gillette, Cheryl on 10/5/2022 at  9:38 AM    Allergies  Known allergies and reactions were discussed with the patient. The patient's chart has been reviewed for allergy information and updated as necessary.   Patient has no known allergies.    Allergies reviewed by Lynda Gillette, Cheryl on 10/5/2022 at  9:37 AM    Relevant Laboratory Values    Common labs    Common Labs 12/30/21   Creatinine 0.80      Comments are available for some flowsheets but are not being displayed.               Current Medication List  This medication list has been reviewed with the patient and evaluated for any interactions or necessary  modifications/recommendations, and updated to include all prescription medications, OTC medications, and supplements the patient is currently taking.  This list reflects what is contained in the patient's profile, which has also been marked as reviewed to communicate to other providers it is the most up to date version of the patient's current medication therapy.     Current Outpatient Medications:   •  amLODIPine (NORVASC) 5 MG tablet, Take 5 mg by mouth Daily., Disp: , Rfl:   •  atorvastatin (LIPITOR) 20 MG tablet, Take 20 mg by mouth Daily., Disp: , Rfl:   •  dicyclomine (BENTYL) 20 MG tablet, TAKE 1 TABLET BY MOUTH 4 TIMES DAILY AS NEEDED, Disp: , Rfl:   •  fluticasone (FLONASE) 50 MCG/ACT nasal spray, 2 sprays into the nostril(s) as directed by provider As Needed for Rhinitis., Disp: , Rfl:   •  levocetirizine (XYZAL) 5 MG tablet, Take 5 mg by mouth Every Evening., Disp: , Rfl:   •  lisinopril (PRINIVIL,ZESTRIL) 40 MG tablet, Take 40 mg by mouth Daily., Disp: , Rfl:   •  loratadine (CLARITIN) 10 MG tablet, Take 10 mg by mouth Daily., Disp: , Rfl:   •  montelukast (SINGULAIR) 10 MG tablet, Take 10 mg by mouth Every Night., Disp: , Rfl:   •  ondansetron (ZOFRAN) 4 MG tablet, Take 4 mg by mouth 3 (Three) Times a Day., Disp: , Rfl:   •  OXcarbazepine (TRILEPTAL) 150 MG tablet, Take 1 tablet by mouth 2 (Two) Times a Day., Disp: 60 tablet, Rfl: 2  •  ubrogepant (ubrogepant) 100 MG tablet, Take 1 tablet by mouth Daily As Needed (migraines - may repeat x 1 in 2 hours (max of 200mg /24 hours))., Disp: 30 tablet, Rfl: 11    Medicines reviewed by Lynda Gillette, PharmD on 10/5/2022 at  9:38 AM    Drug Interactions  none     Adverse Drug Reactions  • Adverse Reactions Experienced: none   • Plan for ADR Management: not required    Hospitalizations and Urgent Care Since Last Assessment  • Hospitalizations or Admissions: none   • ED Visits: none  • Urgent Office Visits: none     Adherence and Self-Administration  Adherence  related patient's specialty therapy was discussed with the patient. The Adherence segment of this outreach has been reviewed and updated.     Medication Adherence    Adherence tools used: directed education   Other adherence tools: prn medication   Support network for adherence: healthcare provider        • Ongoing or New Barriers to Patient Adherence and/or Self-Administration: none   • Methods for Supporting Patient Adherence and/or Self-Administration: nond     Goals of Therapy  Goals related to the patient's specialty therapy was discussed with the patient. The Patient Goals segment of this outreach has been reviewed and updated.    Goals     • Specialty Pharmacy General Goal      Reduction in  severity and length of acute migraine symptoms.  2/10/22 HA frequency has decreased to approximately 5 per month. Ubrelvy is abortive without side effects. Has not had to take a second dosage             Quality of Life Assessment   Quality of Life related to the patient's specialty therapy was discussed with the patient. The QOL segment of this outreach has been reviewed and updated.     Quality of Life Assessment  Quality of Life Improvement Scale: Somewhat better  Comments on Quality of Life: Usually does not have to repeat 2nd dose, but at times does it does not abort.    Reassessment Plan & Follow-Up  1. Medication Therapy Changes: Start oxcarbazepine 150 mg po bid.  2. Additional Plans, Therapy Recommendations, or Therapy Problems to Be Addressed: none  3. Pharmacist to perform regular reassessments no more than (6) months from the previous assessment.  4. Care Coordinator to set up future refill outreaches, coordinate prescription delivery, and escalate clinical questions to pharmacist.     Attestation  I attest that the specialty medication(s) addressed above are appropriate for the patient based on my reassessment.  If the prescribed therapy is at any point deemed not appropriate based on the current or future  assessments, a consultation will be initiated with the patient's specialty care provider to determine the best course of action. The revised plan of therapy will be documented along with any additional patient education provided.     Lynda Gillette, PharmD  10/5/2022  09:51 EDT

## 2022-10-05 NOTE — PROGRESS NOTES
Specialty Pharmacy Refill Coordination Note     Hiral is a 52 y.o. female contacted today regarding refills of  Ubrelvy specialty medication(s).    Plan to ship on 10/10/22 for delivery on 10/11/22.    Reviewed and verified with patient:  Allergies  Meds  Problems       Specialty medication(s) and dose(s) confirmed: yes    Refill Questions    Flowsheet Row Most Recent Value   Changes to allergies? No   Changes to medications? No   New conditions since last clinic visit No   Unplanned office visit, urgent care, ED, or hospital admission in the last 4 weeks  No   How does patient/caregiver feel medication is working? Good   Financial problems or insurance changes  No   If yes, describe changes in insurance or financial issues. n/a   Since the previous refill, were any specialty medication doses or scheduled injections missed or delayed?  No   If yes, please provide the amount n/a   Why were doses missed? n/a   Does this patient require a clinical escalation to a pharmacist? No          Delivery Questions    Flowsheet Row Most Recent Value   Delivery method FedEx   Delivery address correct? Yes   Preferred delivery time? Anytime   Number of medications in delivery 1   Medication being filled and delivered Ubrelvy   Doses left of specialty medications 4 doses   Is there any medication that is due not being filled? No   Supplies needed? No supplies needed   Cooler needed? No   Do any medications need mixed or dated? No   Copay form of payment Payment plan already set up   Questions or concerns for the pharmacist? No   Are any medications first time fills? No            Medication Adherence    Adherence tools used: directed education   Other adherence tool: prn medication   Support network for adherence: healthcare provider          Follow-up: 28 day(s)     Lynda Gillette, PharmD  10/5/2022

## 2022-11-11 ENCOUNTER — DOCUMENTATION (OUTPATIENT)
Dept: ONCOLOGY | Facility: HOSPITAL | Age: 52
End: 2022-11-11

## 2022-11-11 NOTE — PROGRESS NOTES
Specialty Pharmacy Refill Coordination Note     Hiral is a 52 y.o. female contacted today regarding refills of  Ubrelvy specialty medication(s). Patient did not need refilled at this time.    Reviewed and verified with patient:       Specialty medication(s) and dose(s) confirmed: n/a              Medication Adherence    Adherence tools used: directed education   Other adherence tool: prn medication   Support network for adherence: healthcare provider          Follow-up: 1 month(s)     Catrina Mejia, Pharmacy Technician  Specialty Pharmacy Technician

## 2023-01-04 ENCOUNTER — OFFICE VISIT (OUTPATIENT)
Dept: NEUROLOGY | Facility: CLINIC | Age: 53
End: 2023-01-04
Payer: COMMERCIAL

## 2023-01-04 VITALS
SYSTOLIC BLOOD PRESSURE: 102 MMHG | OXYGEN SATURATION: 98 % | DIASTOLIC BLOOD PRESSURE: 70 MMHG | BODY MASS INDEX: 29.19 KG/M2 | HEART RATE: 65 BPM | HEIGHT: 68 IN | WEIGHT: 192.6 LBS | TEMPERATURE: 97.7 F

## 2023-01-04 DIAGNOSIS — G43.C0 PERIODIC HEADACHE SYNDROME, NOT INTRACTABLE: Primary | ICD-10-CM

## 2023-01-04 DIAGNOSIS — I10 PRIMARY HYPERTENSION: ICD-10-CM

## 2023-01-04 PROCEDURE — 99214 OFFICE O/P EST MOD 30 MIN: CPT | Performed by: NURSE PRACTITIONER

## 2023-01-04 RX ORDER — OXCARBAZEPINE 150 MG/1
150 TABLET, FILM COATED ORAL 2 TIMES DAILY
Qty: 60 TABLET | Refills: 11 | Status: SHIPPED | OUTPATIENT
Start: 2023-01-04

## 2023-01-04 NOTE — PROGRESS NOTES
Neuro Office Visit      Encounter Date: 2023   Patient Name: Hiral Easley  : 1970   MRN: [id number]     Chief Complaint:    Chief Complaint   Patient presents with   • Headache       History of Present Illness: Hiral Easley is a 52 y.o. female who is here today in Neurology for migraines.    Last visit 10/4/2022 w me-cont ubrelvy, oxc, monitor bp at home.      HA  Ubrelvy is effective in 30-60 min with no side effects  Freq: can go weeks without a headache. 4 headaches in last 3 months  Location: retro-orbital and right temple  Quality: dull ache  Duration: Ubrelvy is effective in 30 min  Severity:3/10  Triggers: stress, onion  Assoc sx: Nausea, vomiting, photophobia/phonophobia, dizziness, no tinnitus  Aura:tingling and ear pain  Visual Changes    Abortives: Ubrelvy, imitrex, sumatriptan, tylenol, excedrin, ibuprofen, toradol  Preventives:none     Medical records release PCP to send future labs for sodium    HTN  Taking amlodipine, lisinopril.    PMH: htn, ocular migraines      Subjective      Past Medical History:   Past Medical History:   Diagnosis Date   • Anxiety    • Dizziness    • Environmental allergies    • Hyperlipidemia    • Hypertension    • Migraine    • Stomach problems        Past Surgical History:   Past Surgical History:   Procedure Laterality Date   • GALLBLADDER SURGERY     • RETINAL DETACHMENT SURGERY     • VASCULAR SURGERY         Family History:   Family History   Problem Relation Age of Onset   • Hypertension Mother    • Hyperlipidemia Mother    • Migraines Sister    • Hypertension Sister    • Hyperlipidemia Sister    • Seizures Maternal Aunt        Social History:   Social History     Socioeconomic History   • Marital status: Single   Tobacco Use   • Smoking status: Never   • Smokeless tobacco: Never   Vaping Use   • Vaping Use: Never used   Substance and Sexual Activity   • Alcohol use: Yes     Alcohol/week: 4.0 - 6.0 standard drinks     Types: 2 - 3 Glasses  of wine, 2 - 3 Cans of beer per week   • Drug use: Never   • Sexual activity: Yes     Partners: Male       Medications:     Current Outpatient Medications:   •  amLODIPine (NORVASC) 5 MG tablet, Take 5 mg by mouth Daily., Disp: , Rfl:   •  atorvastatin (LIPITOR) 20 MG tablet, Take 20 mg by mouth Daily., Disp: , Rfl:   •  dicyclomine (BENTYL) 20 MG tablet, TAKE 1 TABLET BY MOUTH 4 TIMES DAILY AS NEEDED, Disp: , Rfl:   •  fluticasone (FLONASE) 50 MCG/ACT nasal spray, 2 sprays into the nostril(s) as directed by provider As Needed for Rhinitis., Disp: , Rfl:   •  lisinopril (PRINIVIL,ZESTRIL) 40 MG tablet, Take 40 mg by mouth Daily., Disp: , Rfl:   •  loratadine (CLARITIN) 10 MG tablet, Take 10 mg by mouth Daily., Disp: , Rfl:   •  montelukast (SINGULAIR) 10 MG tablet, Take 10 mg by mouth Every Night., Disp: , Rfl:   •  OXcarbazepine (TRILEPTAL) 150 MG tablet, Take 1 tablet by mouth 2 (Two) Times a Day., Disp: 60 tablet, Rfl: 11  •  ubrogepant 100 MG tablet, Take 1 tablet by mouth Daily As Needed (migraines - may repeat x 1 in 2 hours (max of 200mg /24 hours))., Disp: 30 tablet, Rfl: 11    Allergies:   No Known Allergies    PHQ-9 Total Score:     STEADI Fall Risk Assessment has not been completed.    Objective     Physical Exam:   Physical Exam  Vitals and nursing note reviewed.   Constitutional:       General: She is not in acute distress.     Appearance: She is well-developed.   HENT:      Head: Normocephalic and atraumatic.      Right Ear: External ear normal.      Left Ear: External ear normal.      Nose: Nose normal.   Eyes:      General: No scleral icterus.        Right eye: No discharge.         Left eye: No discharge.      Conjunctiva/sclera: Conjunctivae normal.   Cardiovascular:      Rate and Rhythm: Normal rate.   Pulmonary:      Effort: Pulmonary effort is normal.   Musculoskeletal:         General: No deformity.      Cervical back: Neck supple.   Skin:     General: Skin is warm and dry.      Findings: No  rash.   Neurological:      General: No focal deficit present.      Mental Status: She is alert and oriented to person, place, and time. Mental status is at baseline.      Cranial Nerves: No cranial nerve deficit.      Sensory: No sensory deficit.      Motor: No weakness or abnormal muscle tone.      Gait: Gait normal.   Psychiatric:         Mood and Affect: Mood normal.         Behavior: Behavior normal.         Thought Content: Thought content normal.         Judgment: Judgment normal.         Neurologic Exam     Mental Status   Oriented to person, place, and time.        Vital Signs:   Vitals:    01/04/23 1529   BP: 102/70   Pulse: 65   Temp: 97.7 °F (36.5 °C)   SpO2: 98%   Weight: 87.4 kg (192 lb 9.6 oz)   Height: 172.7 cm (67.99\")     Body mass index is 29.29 kg/m².       Assessment / Plan      Assessment/Plan:   Diagnoses and all orders for this visit:    1. Periodic headache syndrome, not intractable (Primary)  Comments:  Cont Ubrelvy prn. Will need sodium labs.   Orders:  -     OXcarbazepine (TRILEPTAL) 150 MG tablet; Take 1 tablet by mouth 2 (Two) Times a Day.  Dispense: 60 tablet; Refill: 11    2. Primary hypertension  Comments:  Stable on current meds       Pt to have PCP fax labs to us.      Patient Education:     Reviewed medications, potential side effects and signs and symptoms to report. Discussed risk versus benefits of treatment plan with patient and/or family-including medications, labs and radiology that may be ordered. Addressed questions and concerns during visit. Patient and/or family verbalized understanding and agree with plan. Instructed to call the office with any questions and report to ER with any life-threatening symptoms.     Follow Up:   Return in about 1 year (around 1/4/2024) for Medical records release PCP to send future labs for sodium.    During this visit the following were done:  Labs Reviewed [x]    Labs Ordered []    Radiology Reports Reviewed []    Radiology Ordered []    PCP  Records Reviewed []    Referring Provider Records Reviewed []    ER Records Reviewed []    Hospital Records Reviewed []    History Obtained From Family []    Radiology Images Reviewed []    Other Reviewed [x]    Records Requested []      Anna Pretty, DNP, APRN

## 2023-02-24 ENCOUNTER — SPECIALTY PHARMACY (OUTPATIENT)
Dept: ONCOLOGY | Facility: HOSPITAL | Age: 53
End: 2023-02-24
Payer: COMMERCIAL

## 2023-02-24 NOTE — PROGRESS NOTES
Specialty Pharmacy Refill Coordination Note     Hiral is a 52 y.o. female contacted today regarding refills of  Ubrelvy specialty medication(s).     Reviewed and verified with patient:       Specialty medication(s) and dose(s) confirmed: yes    Refill Questions    Flowsheet Row Most Recent Value   Changes to allergies? No   Changes to medications? No   New conditions since last clinic visit No   Unplanned office visit, urgent care, ED, or hospital admission in the last 4 weeks  No   How does patient/caregiver feel medication is working? Excellent   Financial problems or insurance changes  No   Since the previous refill, were any specialty medication doses or scheduled injections missed or delayed?  No  [prn medication]   Does this patient require a clinical escalation to a pharmacist? No          Delivery Questions    Flowsheet Row Most Recent Value   Delivery method FedEx   Delivery address correct? Yes   Preferred delivery time? Anytime   Number of medications in delivery 1   Medication being filled and delivered Ubrelvy   Doses left of specialty medications 4 doses left   Is there any medication that is due not being filled? No   Supplies needed? No supplies needed   Cooler needed? No   Do any medications need mixed or dated? No   Copay form of payment Payment plan already set up   Questions or concerns for the pharmacist? No   Are any medications first time fills? No            Medication Adherence    Adherence tools used: directed education   Other adherence tool: prn medication   Support network for adherence: healthcare provider          Follow-up: 1 month(s)     Catrina Mejia, Pharmacy Technician  Specialty Pharmacy Technician

## 2023-02-24 NOTE — PROGRESS NOTES
Specialty Pharmacy Patient Management Program  Neurology Reassessment     Hiral Easley is a 52 y.o. female with migraines seen by a Neurology provider and enrolled in the Neurology Patient Management program offered by [facility].  A follow-up outreach was conducted, including assessment of continued therapy appropriateness, medication adherence, and side effect incidence and management for  Ubrelvy.     Changes to Insurance Coverage or Financial Support  Rx [employer], Ubrelvy co-pay card.     Relevant Past Medical History and Comorbidities  Relevant medical history and concomitant health conditions were discussed with the patient. The patient's chart has been reviewed for relevant past medical history and comorbid health conditions and updated as necessary.   Past Medical History:   Diagnosis Date   • Anxiety    • Dizziness    • Environmental allergies    • Hyperlipidemia    • Hypertension    • Migraine    • Stomach problems      Social History     Socioeconomic History   • Marital status: Single   Tobacco Use   • Smoking status: Never   • Smokeless tobacco: Never   Vaping Use   • Vaping Use: Never used   Substance and Sexual Activity   • Alcohol use: Yes     Alcohol/week: 4.0 - 6.0 standard drinks     Types: 2 - 3 Glasses of wine, 2 - 3 Cans of beer per week   • Drug use: Never   • Sexual activity: Yes     Partners: Male       Problem list reviewed by Lynda Gillette PharmD on 2/24/2023 at  2:26 PM    Allergies  Known allergies and reactions were discussed with the patient. The patient's chart has been reviewed for allergy information and updated as necessary.   No Known Allergies      Allergies reviewed by Lynda Gillette PharmD on 2/24/2023 at  2:26 PM    Relevant Laboratory Values          Current Medication List  This medication list has been reviewed with the patient and evaluated for any interactions or necessary modifications/recommendations, and updated to include all  prescription medications, OTC medications, and supplements the patient is currently taking.  This list reflects what is contained in the patient's profile, which has also been marked as reviewed to communicate to other providers it is the most up to date version of the patient's current medication therapy.     Current Outpatient Medications:   •  ubrogepant 100 MG tablet, Take 1 tablet by mouth Daily As Needed (migraines). Take at onset of headache - May repeat x 1 in 2 hours if needed (max of 200 mg/ 24 hrs), Disp: 16 tablet, Rfl: 11  •  amLODIPine (NORVASC) 5 MG tablet, Take 5 mg by mouth Daily., Disp: , Rfl:   •  atorvastatin (LIPITOR) 20 MG tablet, Take 20 mg by mouth Daily., Disp: , Rfl:   •  dicyclomine (BENTYL) 20 MG tablet, TAKE 1 TABLET BY MOUTH 4 TIMES DAILY AS NEEDED, Disp: , Rfl:   •  fluticasone (FLONASE) 50 MCG/ACT nasal spray, 2 sprays into the nostril(s) as directed by provider As Needed for Rhinitis., Disp: , Rfl:   •  lisinopril (PRINIVIL,ZESTRIL) 40 MG tablet, Take 40 mg by mouth Daily., Disp: , Rfl:   •  loratadine (CLARITIN) 10 MG tablet, Take 10 mg by mouth Daily., Disp: , Rfl:   •  montelukast (SINGULAIR) 10 MG tablet, Take 10 mg by mouth Every Night., Disp: , Rfl:   •  OXcarbazepine (TRILEPTAL) 150 MG tablet, Take 1 tablet by mouth 2 (Two) Times a Day., Disp: 60 tablet, Rfl: 11    Medicines reviewed by Lynda Gillette, PharmD on 2/24/2023 at  2:26 PM    Drug Interactions  none     Adverse Drug Reactions  • Adverse Reactions Experienced: none   • Plan for ADR Management: not required    Hospitalizations and Urgent Care Since Last Assessment  • Hospitalizations or Admissions: none   • ED Visits: none  • Urgent Office Visits: none     Adherence and Self-Administration  Adherence related patient's specialty therapy was discussed with the patient. The Adherence segment of this outreach has been reviewed and updated.     Medication Adherence    Adherence tools used: directed education   Other  adherence tools: prn medication   Support network for adherence: healthcare provider        • Ongoing or New Barriers to Patient Adherence and/or Self-Administration: none   • Methods for Supporting Patient Adherence and/or Self-Administration: not needed     Goals of Therapy  Goals related to the patient's specialty therapy was discussed with the patient. The Patient Goals segment of this outreach has been reviewed and updated.    Goals     • Specialty Pharmacy General Goal      Reduction in  severity and length of acute migraine symptoms.  2/10/22 HA frequency has decreased to approximately 5 per month. Ubrelvy is abortive without side effects. Has not had to take a second dosage             Quality of Life Assessment   Quality of Life related to the patient's specialty therapy was discussed with the patient. The QOL segment of this outreach has been reviewed and updated.     Quality of Life Assessment  Quality of Life Improvement Scale: A good deal better    Reassessment Plan & Follow-Up  1. Medication Therapy Changes: none  2. Additional Plans, Therapy Recommendations, or Therapy Problems to Be Addressed: none  3. Pharmacist to perform regular reassessments no more than (6) months from the previous assessment.  4. Care Coordinator to set up future refill outreaches, coordinate prescription delivery, and escalate clinical questions to pharmacist.     Attestation  I attest that the specialty medication(s) addressed above are appropriate for the patient based on my reassessment.  If the prescribed therapy is at any point deemed not appropriate based on the current or future assessments, a consultation will be initiated with the patient's specialty care provider to determine the best course of action. The revised plan of therapy will be documented along with any additional patient education provided.     Lynda Gillette, PharmD  2/24/2023  14:30 EST

## 2023-03-29 ENCOUNTER — SPECIALTY PHARMACY (OUTPATIENT)
Dept: ONCOLOGY | Facility: HOSPITAL | Age: 53
End: 2023-03-29
Payer: COMMERCIAL

## 2023-03-29 NOTE — PROGRESS NOTES
Specialty Pharmacy Refill Coordination Note     Hiral is a 53 y.o. female contacted today regarding refills of  Ubrelvy specialty medication(s).    Reviewed and verified with patient:       Specialty medication(s) and dose(s) confirmed: yes    Refill Questions    Flowsheet Row Most Recent Value   Changes to allergies? No   Changes to medications? No   New conditions since last clinic visit No   Unplanned office visit, urgent care, ED, or hospital admission in the last 4 weeks  No   How does patient/caregiver feel medication is working? Excellent   Financial problems or insurance changes  No   Since the previous refill, were any specialty medication doses or scheduled injections missed or delayed?  No  [prn medication]   Does this patient require a clinical escalation to a pharmacist? No          Delivery Questions    Flowsheet Row Most Recent Value   Delivery method FedEx   Delivery address correct? Yes   Preferred delivery time? Anytime   Number of medications in delivery 1   Medication being filled and delivered Ubrelvy   Is there any medication that is due not being filled? No   Supplies needed? No supplies needed   Cooler needed? No   Do any medications need mixed or dated? No   Copay form of payment Payment plan already set up   Questions or concerns for the pharmacist? No   Are any medications first time fills? No            Medication Adherence    Adherence tools used: directed education   Other adherence tool: prn medication   Support network for adherence: healthcare provider          Follow-up: 1 month(s)     Catrina Mejia, Pharmacy Technician  Specialty Pharmacy Technician

## 2023-04-25 ENCOUNTER — SPECIALTY PHARMACY (OUTPATIENT)
Dept: ONCOLOGY | Facility: HOSPITAL | Age: 53
End: 2023-04-25
Payer: COMMERCIAL

## 2023-06-02 ENCOUNTER — DOCUMENTATION (OUTPATIENT)
Dept: ONCOLOGY | Facility: HOSPITAL | Age: 53
End: 2023-06-02

## 2024-01-04 ENCOUNTER — OFFICE VISIT (OUTPATIENT)
Dept: NEUROLOGY | Facility: CLINIC | Age: 54
End: 2024-01-04
Payer: COMMERCIAL

## 2024-01-04 VITALS
WEIGHT: 183.6 LBS | SYSTOLIC BLOOD PRESSURE: 102 MMHG | BODY MASS INDEX: 27.83 KG/M2 | OXYGEN SATURATION: 99 % | HEIGHT: 68 IN | DIASTOLIC BLOOD PRESSURE: 70 MMHG | HEART RATE: 74 BPM

## 2024-01-04 DIAGNOSIS — G43.C0 PERIODIC HEADACHE SYNDROME, NOT INTRACTABLE: ICD-10-CM

## 2024-01-04 PROCEDURE — 99214 OFFICE O/P EST MOD 30 MIN: CPT | Performed by: NURSE PRACTITIONER

## 2024-01-04 RX ORDER — LISINOPRIL 20 MG/1
20 TABLET ORAL DAILY
COMMUNITY
Start: 2023-11-27

## 2024-01-04 RX ORDER — OXCARBAZEPINE 150 MG/1
150 TABLET, FILM COATED ORAL 2 TIMES DAILY
Qty: 60 TABLET | Refills: 11 | Status: SHIPPED | OUTPATIENT
Start: 2024-01-04

## 2024-01-04 RX ORDER — HYDROCHLOROTHIAZIDE 25 MG/1
25 TABLET ORAL DAILY
COMMUNITY
Start: 2023-12-07

## 2024-01-04 NOTE — LETTER
2024     KERRY Joshua  [address] [place] [address]    Patient: Hiral Easley   YOB: 1970   Date of Visit: 2024     Dear KERRY Joshua:       Thank you for referring Hiral Easley to me for evaluation. Below are the relevant portions of my assessment and plan of care.    If you have questions, please do not hesitate to call me. I look forward to following Hiral along with you.         Sincerely,        Anna Pretty DNP, APRN        CC: No Recipients    Anna Pretty DNP, APRN  [phone]  Signed     Neuro Office Visit      Encounter Date: 2024   Patient Name: Hiral Easley  : 1970   MRN: [id number]   PCP: KERRY Vaughn  Chief Complaint:    Chief Complaint   Patient presents with   • Headache       History of Present Illness: Hiral Easley is a 53 y.o. female who is here today in Neurology for  headache and htn      Last visit 2023 w me-start oxc 150 bid, cont ubrelvy         HA  Headaches are improved. Worse with weather changes.    Ubrelvy is effective in 30-60 min with no side effects    Freq: can go weeks without a headache. 4 headaches in last 3 months  Location: retro-orbital and right temple  Quality: dull ache  Duration: Ubrelvy is effective in 30 min  Severity:3/10  Triggers: stress, onion  Assoc sx: Nausea, vomiting, photophobia/phonophobia, dizziness, no tinnitus  Aura:tingling and ear pain  Visual Changes     Abortives: Ubrelvy, imitrex, sumatriptan, tylenol, excedrin, ibuprofen, toradol  Preventives:OXC     Medical records release PCP to send future labs for sodium     HTN  BP running low.  Stopped amlodipine.     PMH: htn, ocular migraines     Subjective      Past Medical History:   Past Medical History:   Diagnosis Date   • Anxiety    • Dizziness    • Environmental allergies    • Hyperlipidemia    • Hypertension    • Migraine    • Stomach problems        Past Surgical History:   Past  Surgical History:   Procedure Laterality Date   • GALLBLADDER SURGERY  2010   • RETINAL DETACHMENT SURGERY  2009   • VASCULAR SURGERY         Family History:   Family History   Problem Relation Age of Onset   • Hypertension Mother    • Hyperlipidemia Mother    • Migraines Sister    • Hypertension Sister    • Hyperlipidemia Sister    • Seizures Maternal Aunt        Social History:   Social History     Socioeconomic History   • Marital status: Single   Tobacco Use   • Smoking status: Never   • Smokeless tobacco: Never   Vaping Use   • Vaping Use: Never used   Substance and Sexual Activity   • Alcohol use: Yes     Alcohol/week: 4.0 - 6.0 standard drinks of alcohol     Types: 2 - 3 Glasses of wine, 2 - 3 Cans of beer per week   • Drug use: Never   • Sexual activity: Yes     Partners: Male       Medications:     Current Outpatient Medications:   •  atorvastatin (LIPITOR) 20 MG tablet, Take 1 tablet by mouth Daily., Disp: , Rfl:   •  dicyclomine (BENTYL) 20 MG tablet, TAKE 1 TABLET BY MOUTH 4 TIMES DAILY AS NEEDED, Disp: , Rfl:   •  fluticasone (FLONASE) 50 MCG/ACT nasal spray, 2 sprays into the nostril(s) as directed by provider As Needed for Rhinitis., Disp: , Rfl:   •  hydroCHLOROthiazide (HYDRODIURIL) 25 MG tablet, Take 1 tablet by mouth Daily., Disp: , Rfl:   •  lisinopril (PRINIVIL,ZESTRIL) 20 MG tablet, Take 1 tablet by mouth Daily., Disp: , Rfl:   •  montelukast (SINGULAIR) 10 MG tablet, Take 1 tablet by mouth Every Night., Disp: , Rfl:   •  OXcarbazepine (TRILEPTAL) 150 MG tablet, Take 1 tablet by mouth 2 (Two) Times a Day., Disp: 60 tablet, Rfl: 11  •  ubrogepant (Ubrelvy) 100 MG tablet, Take 1 tablet by mouth Daily As Needed (migraines). Take at onset of headache - May repeat x 1 in 2 hours if needed (max of 200 mg/ 24 hrs), Disp: 16 tablet, Rfl: 5  •  loratadine (CLARITIN) 10 MG tablet, Take 10 mg by mouth Daily. (Patient not taking: Reported on 1/4/2024), Disp: , Rfl:     Allergies:   No Known  "Allergies    PHQ-9 Total Score:     KRAIG Fall Risk Assessment has not been completed.    Objective     Physical Exam:   Physical Exam  Constitutional:       General: She is not in acute distress.     Appearance: Normal appearance. She is not ill-appearing or toxic-appearing.   HENT:      Head: Normocephalic and atraumatic.      Nose: Nose normal.   Eyes:      General:         Right eye: No discharge.         Left eye: No discharge.      Conjunctiva/sclera: Conjunctivae normal.   Pulmonary:      Effort: Pulmonary effort is normal. No respiratory distress.   Neurological:      General: No focal deficit present.      Mental Status: She is alert and oriented to person, place, and time. Mental status is at baseline.   Psychiatric:         Mood and Affect: Mood normal.         Behavior: Behavior normal.         Thought Content: Thought content normal.         Judgment: Judgment normal.         Neurologic Exam     Mental Status   Oriented to person, place, and time.        Vital Signs:   Vitals:    01/04/24 1432   BP: 102/70   Pulse: 74   SpO2: 99%   Weight: 83.3 kg (183 lb 9.6 oz)   Height: 172.7 cm (67.99\")     Body mass index is 27.92 kg/m².     Re  Assessment / Plan      Assessment/Plan:   Diagnoses and all orders for this visit:    1. Periodic headache syndrome, not intractable  Comments:  Cont Ubrelvy prn.  Orders:  -     ubrogepant (Ubrelvy) 100 MG tablet; Take 1 tablet by mouth Daily As Needed (migraines). Take at onset of headache - May repeat x 1 in 2 hours if needed (max of 200 mg/ 24 hrs)  Dispense: 16 tablet; Refill: 5  -     OXcarbazepine (TRILEPTAL) 150 MG tablet; Take 1 tablet by mouth 2 (Two) Times a Day.  Dispense: 60 tablet; Refill: 11           Patient Education:     Reviewed medications, potential side effects and signs and symptoms to report. Discussed risk versus benefits of treatment plan with patient and/or family-including medications, labs and radiology that may be ordered. Addressed questions " and concerns during visit. Patient and/or family verbalized understanding and agree with plan. Instructed to call the office with any questions and report to ER with any life-threatening symptoms.     Follow Up:   Return in about 1 year (around 1/4/2025) for Recheck.    During this visit the following were done:  Labs Reviewed []    Labs Ordered []    Radiology Reports Reviewed []    Radiology Ordered []    PCP Records Reviewed []    Referring Provider Records Reviewed []    ER Records Reviewed []    Hospital Records Reviewed []    History Obtained From Family []    Radiology Images Reviewed []    Other Reviewed []    Records Requested []      Anna Pretty, DNP, APRN

## 2024-01-04 NOTE — PROGRESS NOTES
Neuro Office Visit      Encounter Date: 2024   Patient Name: Hiral Easley  : 1970   MRN: [id number]   PCP: KERRY Vaughn  Chief Complaint:    Chief Complaint   Patient presents with    Headache       History of Present Illness: Hiral Easley is a 53 y.o. female who is here today in Neurology for  headache and htn      Last visit 2023 w me-start oxc 150 bid, cont ubrelvy         HA  Headaches are improved. Worse with weather changes.    Ubrelvy is effective in 30-60 min with no side effects    Freq: can go weeks without a headache. 4 headaches in last 3 months  Location: retro-orbital and right temple  Quality: dull ache  Duration: Ubrelvy is effective in 30 min  Severity:3/10  Triggers: stress, onion  Assoc sx: Nausea, vomiting, photophobia/phonophobia, dizziness, no tinnitus  Aura:tingling and ear pain  Visual Changes     Abortives: Ubrelvy, imitrex, sumatriptan, tylenol, excedrin, ibuprofen, toradol  Preventives:[facility]     Medical records release PCP to send future labs for sodium     HTN  BP running low.  Stopped amlodipine.     PMH: htn, ocular migraines     Subjective      Past Medical History:   Past Medical History:   Diagnosis Date    Anxiety     Dizziness     Environmental allergies     Hyperlipidemia     Hypertension     Migraine     Stomach problems        Past Surgical History:   Past Surgical History:   Procedure Laterality Date    GALLBLADDER SURGERY  2010    RETINAL DETACHMENT SURGERY  2009    VASCULAR SURGERY         Family History:   Family History   Problem Relation Age of Onset    Hypertension Mother     Hyperlipidemia Mother     Migraines Sister     Hypertension Sister     Hyperlipidemia Sister     Seizures Maternal Aunt        Social History:   Social History     Socioeconomic History    Marital status: Single   Tobacco Use    Smoking status: Never    Smokeless tobacco: Never   Vaping Use    Vaping Use: Never used   Substance and Sexual Activity    Alcohol use: Yes      Alcohol/week: 4.0 - 6.0 standard drinks of alcohol     Types: 2 - 3 Glasses of wine, 2 - 3 Cans of beer per week    Drug use: Never    Sexual activity: Yes     Partners: Male       Medications:     Current Outpatient Medications:     atorvastatin (LIPITOR) 20 MG tablet, Take 1 tablet by mouth Daily., Disp: , Rfl:     dicyclomine (BENTYL) 20 MG tablet, TAKE 1 TABLET BY MOUTH 4 TIMES DAILY AS NEEDED, Disp: , Rfl:     fluticasone (FLONASE) 50 MCG/ACT nasal spray, 2 sprays into the nostril(s) as directed by provider As Needed for Rhinitis., Disp: , Rfl:     hydroCHLOROthiazide (HYDRODIURIL) 25 MG tablet, Take 1 tablet by mouth Daily., Disp: , Rfl:     lisinopril (PRINIVIL,ZESTRIL) 20 MG tablet, Take 1 tablet by mouth Daily., Disp: , Rfl:     montelukast (SINGULAIR) 10 MG tablet, Take 1 tablet by mouth Every Night., Disp: , Rfl:     OXcarbazepine (TRILEPTAL) 150 MG tablet, Take 1 tablet by mouth 2 (Two) Times a Day., Disp: 60 tablet, Rfl: 11    ubrogepant (Ubrelvy) 100 MG tablet, Take 1 tablet by mouth Daily As Needed (migraines). Take at onset of headache - May repeat x 1 in 2 hours if needed (max of 200 mg/ 24 hrs), Disp: 16 tablet, Rfl: 5    loratadine (CLARITIN) 10 MG tablet, Take 10 mg by mouth Daily. (Patient not taking: Reported on 1/4/2024), Disp: , Rfl:     Allergies:   No Known Allergies    PHQ-9 Total Score:     STEADI Fall Risk Assessment has not been completed.    Objective     Physical Exam:   Physical Exam  Constitutional:       General: She is not in acute distress.     Appearance: Normal appearance. She is not ill-appearing or toxic-appearing.   HENT:      Head: Normocephalic and atraumatic.      Nose: Nose normal.   Eyes:      General:         Right eye: No discharge.         Left eye: No discharge.      Conjunctiva/sclera: Conjunctivae normal.   Pulmonary:      Effort: Pulmonary effort is normal. No respiratory distress.   Neurological:      General: No focal deficit present.      Mental Status: She is  "alert and oriented to person, place, and time. Mental status is at baseline.   Psychiatric:         Mood and Affect: Mood normal.         Behavior: Behavior normal.         Thought Content: Thought content normal.         Judgment: Judgment normal.         Neurologic Exam     Mental Status   Oriented to person, place, and time.        Vital Signs:   Vitals:    01/04/24 1432   BP: 102/70   Pulse: 74   SpO2: 99%   Weight: 83.3 kg (183 lb 9.6 oz)   Height: 172.7 cm (67.99\")     Body mass index is 27.92 kg/m².     Re  Assessment / Plan      Assessment/Plan:   Diagnoses and all orders for this visit:    1. Periodic headache syndrome, not intractable  Comments:  Cont Ubrelvy prn.  Orders:  -     ubrogepant (Ubrelvy) 100 MG tablet; Take 1 tablet by mouth Daily As Needed (migraines). Take at onset of headache - May repeat x 1 in 2 hours if needed (max of 200 mg/ 24 hrs)  Dispense: 16 tablet; Refill: 5  -     OXcarbazepine (TRILEPTAL) 150 MG tablet; Take 1 tablet by mouth 2 (Two) Times a Day.  Dispense: 60 tablet; Refill: 11           Patient Education:     Reviewed medications, potential side effects and signs and symptoms to report. Discussed risk versus benefits of treatment plan with patient and/or family-including medications, labs and radiology that may be ordered. Addressed questions and concerns during visit. Patient and/or family verbalized understanding and agree with plan. Instructed to call the office with any questions and report to ER with any life-threatening symptoms.     Follow Up:   Return in about 1 year (around 1/4/2025) for Recheck.    During this visit the following were done:  Labs Reviewed []    Labs Ordered []    Radiology Reports Reviewed []    Radiology Ordered []    PCP Records Reviewed []    Referring Provider Records Reviewed []    ER Records Reviewed []    Hospital Records Reviewed []    History Obtained From Family []    Radiology Images Reviewed []    Other Reviewed []    Records Requested [] "      Anna Pretty, DNP, APRN

## 2024-01-12 ENCOUNTER — TELEPHONE (OUTPATIENT)
Dept: NEUROLOGY | Facility: CLINIC | Age: 54
End: 2024-01-12

## 2024-01-15 ENCOUNTER — SPECIALTY PHARMACY (OUTPATIENT)
Dept: ONCOLOGY | Facility: HOSPITAL | Age: 54
End: 2024-01-15
Payer: COMMERCIAL

## 2024-01-15 DIAGNOSIS — G43.C0 PERIODIC HEADACHE SYNDROME, NOT INTRACTABLE: ICD-10-CM

## 2024-11-08 ENCOUNTER — APPOINTMENT (OUTPATIENT)
Dept: MRI IMAGING | Facility: HOSPITAL | Age: 54
End: 2024-11-08
Payer: COMMERCIAL

## 2024-11-08 ENCOUNTER — HOSPITAL ENCOUNTER (OUTPATIENT)
Facility: HOSPITAL | Age: 54
Setting detail: OBSERVATION
Discharge: HOME | End: 2024-11-09
Attending: INTERNAL MEDICINE | Admitting: INTERNAL MEDICINE
Payer: COMMERCIAL

## 2024-11-08 PROBLEM — R47.81 SLURRED SPEECH: Status: ACTIVE | Noted: 2024-11-08

## 2024-11-08 LAB
ANION GAP SERPL CALCULATED.3IONS-SCNC: 12 MMOL/L (ref 5–15)
BASOPHILS # BLD AUTO: 0.04 10*3/MM3 (ref 0–0.2)
BASOPHILS NFR BLD AUTO: 0.5 % (ref 0–1.5)
BUN SERPL-MCNC: 10 MG/DL (ref 6–20)
BUN/CREAT SERPL: 14.5 (ref 7–25)
CALCIUM SPEC-SCNC: 9.3 MG/DL (ref 8.6–10.5)
CHLORIDE SERPL-SCNC: 104 MMOL/L (ref 98–107)
CO2 SERPL-SCNC: 25 MMOL/L (ref 22–29)
CREAT SERPL-MCNC: 0.69 MG/DL (ref 0.57–1)
DEPRECATED RDW RBC AUTO: 43 FL (ref 37–54)
EGFRCR SERPLBLD CKD-EPI 2021: 104.6 ML/MIN/1.73
EOSINOPHIL # BLD AUTO: 0.05 10*3/MM3 (ref 0–0.4)
EOSINOPHIL NFR BLD AUTO: 0.6 % (ref 0.3–6.2)
ERYTHROCYTE [DISTWIDTH] IN BLOOD BY AUTOMATED COUNT: 13.8 % (ref 12.3–15.4)
GLUCOSE BLDC GLUCOMTR-MCNC: 93 MG/DL (ref 70–130)
GLUCOSE SERPL-MCNC: 118 MG/DL (ref 65–99)
HCT VFR BLD AUTO: 35.9 % (ref 34–46.6)
HGB BLD-MCNC: 11.5 G/DL (ref 12–15.9)
IMM GRANULOCYTES # BLD AUTO: 0.03 10*3/MM3 (ref 0–0.05)
IMM GRANULOCYTES NFR BLD AUTO: 0.4 % (ref 0–0.5)
LYMPHOCYTES # BLD AUTO: 1.76 10*3/MM3 (ref 0.7–3.1)
LYMPHOCYTES NFR BLD AUTO: 20.7 % (ref 19.6–45.3)
MCH RBC QN AUTO: 27.8 PG (ref 26.6–33)
MCHC RBC AUTO-ENTMCNC: 32 G/DL (ref 31.5–35.7)
MCV RBC AUTO: 86.7 FL (ref 79–97)
MONOCYTES # BLD AUTO: 0.7 10*3/MM3 (ref 0.1–0.9)
MONOCYTES NFR BLD AUTO: 8.2 % (ref 5–12)
NEUTROPHILS NFR BLD AUTO: 5.93 10*3/MM3 (ref 1.7–7)
NEUTROPHILS NFR BLD AUTO: 69.6 % (ref 42.7–76)
NRBC BLD AUTO-RTO: 0 /100 WBC (ref 0–0.2)
PLATELET # BLD AUTO: 327 10*3/MM3 (ref 140–450)
PMV BLD AUTO: 9.6 FL (ref 6–12)
POTASSIUM SERPL-SCNC: 4 MMOL/L (ref 3.5–5.2)
RBC # BLD AUTO: 4.14 10*6/MM3 (ref 3.77–5.28)
SODIUM SERPL-SCNC: 141 MMOL/L (ref 136–145)
WBC NRBC COR # BLD AUTO: 8.51 10*3/MM3 (ref 3.4–10.8)

## 2024-11-08 PROCEDURE — 99214 OFFICE O/P EST MOD 30 MIN: CPT

## 2024-11-08 PROCEDURE — G0378 HOSPITAL OBSERVATION PER HR: HCPCS

## 2024-11-08 PROCEDURE — 80048 BASIC METABOLIC PNL TOTAL CA: CPT | Performed by: STUDENT IN AN ORGANIZED HEALTH CARE EDUCATION/TRAINING PROGRAM

## 2024-11-08 PROCEDURE — 85025 COMPLETE CBC W/AUTO DIFF WBC: CPT | Performed by: STUDENT IN AN ORGANIZED HEALTH CARE EDUCATION/TRAINING PROGRAM

## 2024-11-08 PROCEDURE — 70551 MRI BRAIN STEM W/O DYE: CPT

## 2024-11-08 PROCEDURE — 99223 1ST HOSP IP/OBS HIGH 75: CPT | Performed by: STUDENT IN AN ORGANIZED HEALTH CARE EDUCATION/TRAINING PROGRAM

## 2024-11-08 PROCEDURE — G0379 DIRECT REFER HOSPITAL OBSERV: HCPCS

## 2024-11-08 PROCEDURE — 82948 REAGENT STRIP/BLOOD GLUCOSE: CPT

## 2024-11-08 RX ORDER — ASPIRIN 81 MG/1
81 TABLET, CHEWABLE ORAL DAILY
Status: DISCONTINUED | OUTPATIENT
Start: 2024-11-09 | End: 2024-11-09 | Stop reason: HOSPADM

## 2024-11-08 RX ORDER — SODIUM CHLORIDE 0.9 % (FLUSH) 0.9 %
10 SYRINGE (ML) INJECTION EVERY 12 HOURS SCHEDULED
Status: DISCONTINUED | OUTPATIENT
Start: 2024-11-08 | End: 2024-11-09 | Stop reason: HOSPADM

## 2024-11-08 RX ORDER — ASPIRIN 300 MG/1
300 SUPPOSITORY RECTAL DAILY
Status: DISCONTINUED | OUTPATIENT
Start: 2024-11-09 | End: 2024-11-09 | Stop reason: HOSPADM

## 2024-11-08 RX ORDER — SODIUM CHLORIDE 9 MG/ML
40 INJECTION, SOLUTION INTRAVENOUS AS NEEDED
Status: DISCONTINUED | OUTPATIENT
Start: 2024-11-08 | End: 2024-11-09 | Stop reason: HOSPADM

## 2024-11-08 RX ORDER — ACETAMINOPHEN 325 MG/1
650 TABLET ORAL EVERY 6 HOURS PRN
Status: DISCONTINUED | OUTPATIENT
Start: 2024-11-08 | End: 2024-11-09 | Stop reason: HOSPADM

## 2024-11-08 RX ORDER — ATORVASTATIN CALCIUM 40 MG/1
80 TABLET, FILM COATED ORAL NIGHTLY
Status: DISCONTINUED | OUTPATIENT
Start: 2024-11-08 | End: 2024-11-09 | Stop reason: HOSPADM

## 2024-11-08 RX ORDER — SODIUM CHLORIDE 0.9 % (FLUSH) 0.9 %
10 SYRINGE (ML) INJECTION AS NEEDED
Status: DISCONTINUED | OUTPATIENT
Start: 2024-11-08 | End: 2024-11-09 | Stop reason: HOSPADM

## 2024-11-08 RX ORDER — ONDANSETRON 2 MG/ML
4 INJECTION INTRAMUSCULAR; INTRAVENOUS EVERY 6 HOURS PRN
Status: DISCONTINUED | OUTPATIENT
Start: 2024-11-08 | End: 2024-11-09 | Stop reason: HOSPADM

## 2024-11-08 RX ORDER — NITROGLYCERIN 0.4 MG/1
0.4 TABLET SUBLINGUAL
Status: DISCONTINUED | OUTPATIENT
Start: 2024-11-08 | End: 2024-11-09 | Stop reason: HOSPADM

## 2024-11-08 RX ADMIN — ATORVASTATIN CALCIUM 80 MG: 40 TABLET, FILM COATED ORAL at 21:09

## 2024-11-08 RX ADMIN — ACETAMINOPHEN 650 MG: 325 TABLET ORAL at 22:10

## 2024-11-08 NOTE — CONSULTS
"Stroke Consult Note    Patient Name: Hiral Easley   MRN: [id number]  Age: 54 y.o.  Sex: female  : 1970    Primary Care Physician: Chilo Balderrama MD  Referring Physician: Dr. Alvin Villavicencio, [facility]    TIME STROKE TEAM CALLED: 1259 EST     TIME PATIENT SEEN: 1800 EST    Handedness: Right  Race:     Chief Complaint/Reason for Consultation: Syncopal episode with questionable right facial droop and dysarthria    HPI: Mrs. Easley is a 54-year-old female with known medical diagnoses of essential hypertension, hyperlipidemia, anxiety, and ocular migraines (on oxycarbamazepine and Ubrelvy) who presented to her local emergency department today after experiencing a presyncopal episode which was witnessed by her neighbor noted that she had a right facial droop and dysarthria prompting them to contact EMS.  The patient states that she felt she was in her normal state of health today when she went to work however she did note that her vision seemed to be off.  When she got to work she began to feel nauseous (this  was at approximately 10:30 AM).  She decided to go home and during that time felt \"off and foggy headed\" and that she was seeing bright lights.  She states that prior to getting out of her car she felt that something was not right and when she went to get out of her car sustained a fall onto the blacktop.  She is unsure if she lost consciousness however does note that her neighbors did come to check on her.  She does not recall having any dysarthria however feels that her speech was \"slowed\".  She denies experiencing any unilateral weakness, numbness, or balance difficulty.  Of note the patient's blood pressure at time of acceptance was noted to be 99/61.    She does not currently take any antiplatelet or anticoagulation medications.  She is a non-smoker, occasionally consumes EtOH, and denies illicit drug use.    Last Known Normal Date/Time: Patient is currently back to her " [id number];  EST     Review of Systems   Constitutional:  Positive for activity change. Negative for chills, fatigue and fever.   Eyes:  Positive for visual disturbance. Negative for photophobia.   Respiratory: Negative.     Cardiovascular: Negative.  Negative for chest pain and palpitations.   Gastrointestinal: Negative.  Negative for blood in stool, diarrhea, nausea and vomiting.   Genitourinary: Negative.  Negative for hematuria.   Musculoskeletal: Negative.    Skin: Negative.    Neurological:  Positive for syncope, weakness (Generalized), light-headedness and headaches. Negative for dizziness, speech difficulty and numbness.   Psychiatric/Behavioral:  Positive for confusion.       Past Medical History:   Diagnosis Date    Anxiety     Dizziness     Environmental allergies     Hyperlipidemia     Hypertension     Migraine     Stomach problems      Past Surgical History:   Procedure Laterality Date    GALLBLADDER SURGERY  2010    RETINAL DETACHMENT SURGERY  2009    VASCULAR SURGERY       Family History   Problem Relation Age of Onset    Hypertension Mother     Hyperlipidemia Mother     Migraines Sister     Hypertension Sister     Hyperlipidemia Sister     Seizures Maternal Aunt      Social History     Socioeconomic History    Marital status: Single   Tobacco Use    Smoking status: Never    Smokeless tobacco: Never   Vaping Use    Vaping status: Never Used   Substance and Sexual Activity    Alcohol use: Yes     Alcohol/week: 4.0 - 6.0 standard drinks of alcohol     Types: 2 - 3 Glasses of wine, 2 - 3 Cans of beer per week    Drug use: Never    Sexual activity: Yes     Partners: Male     No Known Allergies  Prior to Admission medications    Medication Sig Start Date End Date Taking? Authorizing Provider   atorvastatin (LIPITOR) 20 MG tablet Take 1 tablet by mouth Daily.    Felicia Hubbard MD   dicyclomine (BENTYL) 20 MG tablet TAKE 1 TABLET BY MOUTH 4 TIMES DAILY AS NEEDED 5/9/22   Felicia Hubbard MD    fluticasone (FLONASE) 50 MCG/ACT nasal spray 2 sprays into the nostril(s) as directed by provider As Needed for Rhinitis.    Felicia Hubbard MD   hydroCHLOROthiazide (HYDRODIURIL) 25 MG tablet Take 1 tablet by mouth Daily. 12/7/23   Felicia Hubbard MD   lisinopril (PRINIVIL,ZESTRIL) 20 MG tablet Take 1 tablet by mouth Daily. 11/27/23   Felicia Hubbard MD   loratadine (CLARITIN) 10 MG tablet Take 10 mg by mouth Daily.  Patient not taking: Reported on 1/4/2024    Felicia Hubbard MD   montelukast (SINGULAIR) 10 MG tablet Take 1 tablet by mouth Every Night.    Felicia Hubbard MD   OXcarbazepine (TRILEPTAL) 150 MG tablet Take 1 tablet by mouth 2 (Two) Times a Day. 1/4/24   Anna Pretty DNP, APRN   ubrogepant (Ubrelvy) 100 MG tablet Take 1 tablet by mouth Daily As Needed (migraines). Take at onset of headache - May repeat x 1 in 2 hours if needed (max of 200 mg/ 24 hrs) 1/15/24   Anna Pretty DNP, APRN         Temp:  [97.7 °F (36.5 °C)] 97.7 °F (36.5 °C)  Heart Rate:  [82] 82  Resp:  [17] 17  BP: (105)/(67) 105/67  Neurological Exam  Mental Status  Alert. Oriented to person, place, time and situation. Oriented to person, place, and time. Speech is normal. Language is fluent with no aphasia. Attention and concentration are normal.    Cranial Nerves  CN II: Visual fields full to confrontation.  CN III, IV, VI: Extraocular movements intact bilaterally. Pupils equal round and reactive to light bilaterally.  CN V: Facial sensation is normal.  CN VII: Full and symmetric facial movement.  CN VIII: Hearing appears to be intact bilaterally.  CN XII: Tongue midline without atrophy or fasciculations.    Motor  Normal muscle bulk throughout. Normal muscle tone. Strength is 5/5 throughout all four extremities.    Sensory  Sensation is intact to light touch, pinprick, vibration and proprioception in all four extremities.    Coordination  Right: Finger-to-nose normal.Left: Finger-to-nose  normal.    Gait    Not observed.      Physical Exam  Vitals and nursing note reviewed.   Constitutional:       General: She is not in acute distress.     Appearance: Normal appearance. She is not ill-appearing.   HENT:      Head: Normocephalic and atraumatic.      Mouth/Throat:      Mouth: Mucous membranes are moist.   Eyes:      Extraocular Movements: Extraocular movements intact.      Pupils: Pupils are equal, round, and reactive to light.   Cardiovascular:      Rate and Rhythm: Normal rate and regular rhythm.   Pulmonary:      Effort: Pulmonary effort is normal. No respiratory distress.      Comments: On room air  Skin:     General: Skin is warm and dry.   Neurological:      General: No focal deficit present.      Mental Status: She is alert and oriented to person, place, and time.      Motor: Motor strength is normal.  Psychiatric:         Mood and Affect: Mood normal.         Speech: Speech normal.         Behavior: Behavior normal.         Acute Stroke Data    Thrombolytic Inclusion / Exclusion Criteria    Time: 18:20 EST  Person Administering Scale: KERRY Stapleton    Inclusion Criteria  [x]   18 years of age or greater   [x]   Onset of symptoms < 4.5 hours before beginning treatment (stroke onset = time patient was last seen well or without symptoms).   []   Diagnosis of acute ischemic stroke causing measurable disabling deficit (Complete Hemianopia, Any Aphasia, Visual or Sensory Extinction, Any weakness limiting sustained effort against gravity)   []   Any remaining deficit considered potentially disabling in view of patient and practitioner   Exclusion criteria (Do not proceed with Alteplase if any are checked under exclusion criteria)  []   Onset unknown or GREATER than 4.5 hours   []   ICH on CT/MRI   []   CT demonstrates hypodensity representing acute or subacute infarct   []   Significant head trauma or prior stroke in the previous 3 months   []   Symptoms suggestive of subarachnoid  hemorrhage   []   History of un-ruptured intracranial aneurysm GREATER than 10 mm   []   Recent intracranial or intraspinal surgery within the last 3 months   []   Arterial puncture at a non-compressible site in the previous 7 days   []   Active internal bleeding   []   Acute bleeding tendency   []   Platelet count LESS than 100,000 for known hematological diseases such as leukemia, thrombocytopenia or chronic cirrhosis   []   Current use of anticoagulant with INR GREATER than 1.7 or PT GREATER than 15 seconds, aPTT GREATER than 40 seconds   []   Heparin received within 48 hours, resulting in abnormally elevated aPTT GREATER than upper limit of normal   []   Current use of direct thrombin inhibitors or direct factor Xa inhibitors in the past 48 hours   []   Elevated blood pressure refractory to treatment (systolic GREATER than 185 mm/Hg or diastolic  GREATER than 110 mm/Hg   []   Suspected infective endocarditis and aortic arch dissection   []   Current use of therapeutic treatment dose of low-molecular-weight heparin (LMWH) within the previous 24 hours   []   Structural GI malignancy or bleed   Relative exclusion for all patients  [x]   Only minor non-disabling symptoms/return to baseline   []   Pregnancy   []   Seizure at onset with postictal residual neurological impairments   []   Major surgery or previous trauma within past 14 days   []   History of previous spontaneous ICH, intracranial neoplasm, or AV malformation   []   Postpartum (within previous 14 days)   []   Recent GI or urinary tract hemorrhage (within previous 21 days)   []   Recent acute MI (within previous 3 months)   []   History of un-ruptured intracranial aneurysm LESS than 10 mm   []   History of ruptured intracranial aneurysm   []   Blood glucose LESS than 50 mg/dL (2.7 mmol/L)   []   Dural puncture within the last 7 days   []   Known GREATER than 10 cerebral microbleeds   Additional exclusions for patients with symptoms onset between 3 and 4.5  hours.  []   Age > 80.   []   On any anticoagulants regardless of INR  >>> Warfarin (Coumadin), Heparin, Enoxaparin (Lovenox), fondaparinux (Arixtra), bivalirudin (Angiomax), Argatroban, dabigatran (Pradaxa), rivaroxaban (Xarelto), or apixaban (Eliquis)   []   Severe stroke (NIHSS > 25).   []   History of BOTH diabetes and previous ischemic stroke.   []   The risks and benefits have been discussed with the patient or family related to the administration of IV thrombolytic therapy for stroke symptoms.   []   I have discussed and reviewed the patient's case and imaging with the attending prior to IV thrombolytic therapy.   N/A Time IV thrombolytic administered       Hospital Meds:  Scheduled- [START ON 11/9/2024] aspirin, 81 mg, Oral, Daily   Or  [START ON 11/9/2024] aspirin, 300 mg, Rectal, Daily  atorvastatin, 80 mg, Oral, Nightly  sodium chloride, 10 mL, Intravenous, Q12H  sodium chloride, 10 mL, Intravenous, Q12H      Infusions-     PRNs-   Calcium Replacement - Follow Nurse / BPA Driven Protocol    Magnesium Standard Dose Replacement - Follow Nurse / BPA Driven Protocol    nitroglycerin    ondansetron    Phosphorus Replacement - Follow Nurse / BPA Driven Protocol    Potassium Replacement - Follow Nurse / BPA Driven Protocol    sodium chloride    sodium chloride    sodium chloride    sodium chloride    Functional Status Prior to Current Stroke/Dread Score: 0    NIH Stroke Scale  Time: 18:20 EST  Person Administering Scale: KERRY Stapleton    Interval: baseline  1a. Level of Consciousness: 0-->Alert, keenly responsive  1b. LOC Questions: 0-->Answers both questions correctly  1c. LOC Commands: 0-->Performs both tasks correctly  2. Best Gaze: 0-->Normal  3. Visual: 0-->No visual loss  4. Facial Palsy: 0-->Normal symmetrical movements  5a. Motor Arm, Left: 0-->No drift, limb holds 90 (or 45) degrees for full 10 secs  5b. Motor Arm, Right: 0-->No drift, limb holds 90 (or 45) degrees for full 10 secs  6a.  Motor Leg, Left: 0-->No drift, leg holds 30 degree position for full 5 secs  6b. Motor Leg, Right: 0-->No drift, leg holds 30 degree position for full 5 secs  7. Limb Ataxia: 0-->Absent  8. Sensory: 0-->Normal, no sensory loss  9. Best Language: 0-->No aphasia, normal  10. Dysarthria: 0-->Normal  11. Extinction and Inattention (formerly Neglect): 0-->No abnormality    Total (NIH Stroke Scale): 0        Results Reviewed:  I have personally reviewed current lab, radiology, and data and agree with results.    CT head without contrast from OSH (reviewed via zhiwo allison) negative for hemorrhage and/or acute process.    CTA head/neck reviewed from OSH (reviewed via zhiwo allison) negative for flow-limiting stenosis or LVO.    Assessment/Plan:    This is a 54-year-old female with known medical diagnoses of essential hypertension, hyperlipidemia, anxiety, and ocular migraines (on oxycarbamazepine and Ubrelvy) who presented to her local emergency department today after experiencing a presyncopal episode which was witnessed by her neighbor noted that she had a right facial droop and dysarthria prompting them to contact EMS.  She was evaluated at her local emergency department and underwent a CT of the head without contrast which was negative for hemorrhage and/or acute process.  CTA head/neck was negative for flow-limiting stenosis or LVO.  She was transferred to our facility for higher level of care and further workup.    Antiplatelet PTA: None  Anticoagulant PTA: None        Syncopal episode with questionable right facial droop and dysarthria  Differential diagnoses include TIA/CVA (less likely) versus metabolic disarrangement versus complex migraine versus hypoperfusion 2/2 hypotension  -TIA/CVA order set without thrombolytic therapy has been initiated  -NPO until bedside nursing dysphagia screen completed  -MRI brain without contrast  -TTE   -A1c and lipid panel in AM  -Meds: ASA 81 mg daily  -Activity as tolerated, fall  risk precautions  -PT/OT/SLP evaluation  -Patient received migraine cocktail prior to admission; 1 L normal saline fluid, 1 g IV magnesium, 25 mg IV Benadryl, 10 mg IV Compazine  -Recommend infectious workup  -Recommend orthostatic vital signs    2.  Essential hypertension  -Allow autoregulation of blood pressure for adequate cerebral blood flow  -Nicardipine as needed for SBP >220    3.  Hyperlipidemia  -Lipid panel in AM  -Atorvastatin 80mg nightly    Plan of care was discussed with patient, family, and primary team prior to admission (Dr. Miller, hospitalist).  Stroke neurology will continue to follow.  Please call with any questions or concerns.  Thank you for this consult.     Sarita Curiel, APRN  November 8, 2024  18:20 EST

## 2024-11-08 NOTE — H&P
"    [facility] Medicine Services  HISTORY AND PHYSICAL    Patient Name: Hiral Easley  : 1970  MRN: [id number]  Primary Care Physician: Chilo Balderrama MD  Date of admission: 2024      Subjective   Subjective     Chief Complaint:  Slurred speech, right facial droop, syncope    HPI:  Hiral Easley is a 54 y.o. female with past medical history hypertension, hyperlipidemia, migraines.  Initially presented to [facility] in [facility] with concern for blurry vision onset 9:30 AM, 10:30 AM nauseated and drove home from work but does not remember much and felt \"drugged\".  Reports that she collapsed after she got out of her car and neighbors said that she had dysarthria and right facial droop.  ED workup reportedly CT head and CTA head and neck negative.  Blood pressure soft, responded well to 1 L fluid.  She was provided migraine cocktail.  Reportedly has had complicated management history with her blood pressure, possible may have been related to hypotension.  Transferred to [facility] 2024 with plan for MRI and further neurology workup.      Personal History     Past Medical History:   Diagnosis Date    Anxiety     Dizziness     Environmental allergies     Hyperlipidemia     Hypertension     Migraine     Stomach problems            Past Surgical History:   Procedure Laterality Date    GALLBLADDER SURGERY      RETINAL DETACHMENT SURGERY  2009    VASCULAR SURGERY         Family History: family history includes Hyperlipidemia in her mother and sister; Hypertension in her mother and sister; Migraines in her sister; Seizures in her maternal aunt.     Social History:  reports that she has never smoked. She has never used smokeless tobacco. She reports current alcohol use of about 4.0 - 6.0 standard drinks of alcohol per week. She reports that she does not use drugs.  Social History     Social History Narrative    Not on file "       Medications:  Available home medication information reviewed.  OXcarbazepine, atorvastatin, dicyclomine, fluticasone, hydroCHLOROthiazide, lisinopril, loratadine, montelukast, and ubrogepant    No Known Allergies    Objective   Objective     Vital Signs:   Temp:  [97.7 °F (36.5 °C)] 97.7 °F (36.5 °C)  Heart Rate:  [82] 82  Resp:  [17] 17  BP: (105)/(67) 105/67  Total (NIH Stroke Scale): 0    Physical Exam  Constitutional:       General: She is not in acute distress.     Appearance: She is not ill-appearing or toxic-appearing.   HENT:      Mouth/Throat:      Mouth: Mucous membranes are moist.   Eyes:      Extraocular Movements: Extraocular movements intact.   Cardiovascular:      Rate and Rhythm: Normal rate and regular rhythm.      Pulses: Normal pulses.      Heart sounds: Normal heart sounds.   Pulmonary:      Effort: Pulmonary effort is normal.      Breath sounds: Normal breath sounds.   Abdominal:      Palpations: Abdomen is soft.      Tenderness: There is no abdominal tenderness.   Musculoskeletal:      Right lower leg: No edema.      Left lower leg: No edema.   Skin:     General: Skin is warm.      Capillary Refill: Capillary refill takes less than 2 seconds.   Neurological:      General: No focal deficit present.      Mental Status: She is alert and oriented to person, place, and time.   Psychiatric:         Mood and Affect: Mood normal.         Thought Content: Thought content normal.            Result Review:  I have personally reviewed the results from the time of this admission to 11/8/2024 16:56 EST and agree with these findings:  [x]  Laboratory list / accordion  []  Microbiology  [x]  Radiology  [x]  EKG/Telemetry   []  Cardiology/Vascular   [x]  Pathology  [x]  Old records  []  Other:        LAB RESULTS:                                  Microbiology Results (last 10 days)       ** No results found for the last 240 hours. **            No radiology results from the last 24 hrs        Assessment &  Plan   Assessment & Plan       * No active hospital problems. *    Blurry vision  Slurred speech  Syncope  Stroke team consulted, MRI pending.  Standard stroke workup order set.    Patient may have had orthostatic hypotension around time of symptoms?  Chronic: hypertension, hyperlipidemia, migraines    VTE Prophylaxis:  No VTE prophylaxis order currently exists.          CODE STATUS:    There are no questions and answers to display.       Expected Discharge   Expected discharge date/ time has not been documented.     Brian Joseph Kerley, DO  11/08/24

## 2024-11-09 ENCOUNTER — APPOINTMENT (OUTPATIENT)
Dept: CARDIOLOGY | Facility: HOSPITAL | Age: 54
End: 2024-11-09
Payer: COMMERCIAL

## 2024-11-09 VITALS
HEART RATE: 59 BPM | WEIGHT: 183 LBS | BODY MASS INDEX: 27.74 KG/M2 | TEMPERATURE: 98 F | RESPIRATION RATE: 18 BRPM | HEIGHT: 68 IN | DIASTOLIC BLOOD PRESSURE: 77 MMHG | OXYGEN SATURATION: 96 % | SYSTOLIC BLOOD PRESSURE: 133 MMHG

## 2024-11-09 PROBLEM — I95.9 HYPOTENSION: Status: ACTIVE | Noted: 2024-11-09

## 2024-11-09 LAB
ANION GAP SERPL CALCULATED.3IONS-SCNC: 10 MMOL/L (ref 5–15)
ASCENDING AORTA: 3.3 CM
BASOPHILS # BLD AUTO: 0.04 10*3/MM3 (ref 0–0.2)
BASOPHILS NFR BLD AUTO: 0.6 % (ref 0–1.5)
BH CV ECHO MEAS - AO MAX PG: 8 MMHG
BH CV ECHO MEAS - AO MEAN PG: 4 MMHG
BH CV ECHO MEAS - AO ROOT DIAM: 3.1 CM
BH CV ECHO MEAS - AO V2 MAX: 145.3 CM/SEC
BH CV ECHO MEAS - AO V2 VTI: 31 CM
BH CV ECHO MEAS - AVA(I,D): 2.1 CM2
BH CV ECHO MEAS - EF(MOD-BP): 59.6 %
BH CV ECHO MEAS - IVS/LVPW: 1 CM
BH CV ECHO MEAS - IVSD: 0.8 CM
BH CV ECHO MEAS - LA DIMENSION: 3.9 CM
BH CV ECHO MEAS - LAT PEAK E' VEL: 12 CM/SEC
BH CV ECHO MEAS - LV MAX PG: 5 MMHG
BH CV ECHO MEAS - LV MEAN PG: 2.38 MMHG
BH CV ECHO MEAS - LV V1 MAX: 111.2 CM/SEC
BH CV ECHO MEAS - LV V1 VTI: 25.9 CM
BH CV ECHO MEAS - LVIDD: 5 CM
BH CV ECHO MEAS - LVIDS: 3.2 CM
BH CV ECHO MEAS - LVOT DIAM: 2 CM
BH CV ECHO MEAS - LVPWD: 0.8 CM
BH CV ECHO MEAS - MED PEAK E' VEL: 14.7 CM/SEC
BH CV ECHO MEAS - MV A MAX VEL: 56 CM/SEC
BH CV ECHO MEAS - MV DEC SLOPE: 556.1 CM/SEC2
BH CV ECHO MEAS - MV E MAX VEL: 71.6 CM/SEC
BH CV ECHO MEAS - MV E/A: 1.28
BH CV ECHO MEAS - MV MAX PG: 3.8 MMHG
BH CV ECHO MEAS - MV MEAN PG: 1.7 MMHG
BH CV ECHO MEAS - MV P1/2T: 55 MSEC
BH CV ECHO MEAS - MV V2 VTI: 27.4 CM
BH CV ECHO MEAS - MVA(P1/2T): 4 CM2
BH CV ECHO MEAS - PA ACC TIME: 0.16 SEC
BH CV ECHO MEAS - PA V2 MAX: 102.7 CM/SEC
BH CV ECHO MEAS - PI END-D VEL: 100.9 CM/SEC
BH CV ECHO MEAS - RAP SYSTOLE: 3 MMHG
BH CV ECHO MEAS - RVSP: 27 MMHG
BH CV ECHO MEAS - TAPSE (>1.6): 2.6 CM
BH CV ECHO MEAS - TR MAX PG: 24 MMHG
BH CV ECHO MEAS - TR MAX VEL: 243.7 CM/SEC
BH CV ECHO MEASUREMENTS AVERAGE E/E' RATIO: 5.36
BH CV ECHO SHUNT ASSESSMENT PERFORMED (HIDDEN SCRIPTING): 1
BH CV VAS BP LEFT ARM: NORMAL MMHG
BH CV XLRA - RV BASE: 3.7 CM
BH CV XLRA - RV LENGTH: 6.6 CM
BH CV XLRA - RV MID: 2.7 CM
BH CV XLRA - TDI S': 17 CM/SEC
BUN SERPL-MCNC: 11 MG/DL (ref 6–20)
BUN/CREAT SERPL: 14.5 (ref 7–25)
CALCIUM SPEC-SCNC: 9.5 MG/DL (ref 8.6–10.5)
CHLORIDE SERPL-SCNC: 102 MMOL/L (ref 98–107)
CHOLEST SERPL-MCNC: 177 MG/DL (ref 0–200)
CO2 SERPL-SCNC: 26 MMOL/L (ref 22–29)
CREAT SERPL-MCNC: 0.76 MG/DL (ref 0.57–1)
DEPRECATED RDW RBC AUTO: 44.7 FL (ref 37–54)
EGFRCR SERPLBLD CKD-EPI 2021: 94.4 ML/MIN/1.73
EOSINOPHIL # BLD AUTO: 0.12 10*3/MM3 (ref 0–0.4)
EOSINOPHIL NFR BLD AUTO: 1.8 % (ref 0.3–6.2)
ERYTHROCYTE [DISTWIDTH] IN BLOOD BY AUTOMATED COUNT: 13.9 % (ref 12.3–15.4)
GLUCOSE BLDC GLUCOMTR-MCNC: 96 MG/DL (ref 70–130)
GLUCOSE SERPL-MCNC: 96 MG/DL (ref 65–99)
HBA1C MFR BLD: 5.4 % (ref 4.8–5.6)
HCT VFR BLD AUTO: 35.7 % (ref 34–46.6)
HDLC SERPL-MCNC: 45 MG/DL (ref 40–60)
HGB BLD-MCNC: 11.3 G/DL (ref 12–15.9)
IMM GRANULOCYTES # BLD AUTO: 0.03 10*3/MM3 (ref 0–0.05)
IMM GRANULOCYTES NFR BLD AUTO: 0.5 % (ref 0–0.5)
LDLC SERPL CALC-MCNC: 100 MG/DL (ref 0–100)
LDLC/HDLC SERPL: 2.1 {RATIO}
LEFT ATRIUM VOLUME INDEX: 26.4 ML/M2
LYMPHOCYTES # BLD AUTO: 1.81 10*3/MM3 (ref 0.7–3.1)
LYMPHOCYTES NFR BLD AUTO: 27.2 % (ref 19.6–45.3)
MCH RBC QN AUTO: 27.8 PG (ref 26.6–33)
MCHC RBC AUTO-ENTMCNC: 31.7 G/DL (ref 31.5–35.7)
MCV RBC AUTO: 87.9 FL (ref 79–97)
MONOCYTES # BLD AUTO: 0.86 10*3/MM3 (ref 0.1–0.9)
MONOCYTES NFR BLD AUTO: 12.9 % (ref 5–12)
NEUTROPHILS NFR BLD AUTO: 3.79 10*3/MM3 (ref 1.7–7)
NEUTROPHILS NFR BLD AUTO: 57 % (ref 42.7–76)
NRBC BLD AUTO-RTO: 0 /100 WBC (ref 0–0.2)
PLATELET # BLD AUTO: 292 10*3/MM3 (ref 140–450)
PMV BLD AUTO: 9.8 FL (ref 6–12)
POTASSIUM SERPL-SCNC: 3.9 MMOL/L (ref 3.5–5.2)
RBC # BLD AUTO: 4.06 10*6/MM3 (ref 3.77–5.28)
SODIUM SERPL-SCNC: 138 MMOL/L (ref 136–145)
TRIGL SERPL-MCNC: 188 MG/DL (ref 0–150)
VLDLC SERPL-MCNC: 32 MG/DL (ref 5–40)
WBC NRBC COR # BLD AUTO: 6.65 10*3/MM3 (ref 3.4–10.8)

## 2024-11-09 PROCEDURE — 97165 OT EVAL LOW COMPLEX 30 MIN: CPT

## 2024-11-09 PROCEDURE — 83036 HEMOGLOBIN GLYCOSYLATED A1C: CPT

## 2024-11-09 PROCEDURE — 80061 LIPID PANEL: CPT

## 2024-11-09 PROCEDURE — 93306 TTE W/DOPPLER COMPLETE: CPT

## 2024-11-09 PROCEDURE — 93306 TTE W/DOPPLER COMPLETE: CPT | Performed by: INTERNAL MEDICINE

## 2024-11-09 PROCEDURE — 85025 COMPLETE CBC W/AUTO DIFF WBC: CPT | Performed by: STUDENT IN AN ORGANIZED HEALTH CARE EDUCATION/TRAINING PROGRAM

## 2024-11-09 PROCEDURE — 99214 OFFICE O/P EST MOD 30 MIN: CPT | Performed by: STUDENT IN AN ORGANIZED HEALTH CARE EDUCATION/TRAINING PROGRAM

## 2024-11-09 PROCEDURE — 82948 REAGENT STRIP/BLOOD GLUCOSE: CPT

## 2024-11-09 PROCEDURE — G0378 HOSPITAL OBSERVATION PER HR: HCPCS

## 2024-11-09 PROCEDURE — 80048 BASIC METABOLIC PNL TOTAL CA: CPT | Performed by: STUDENT IN AN ORGANIZED HEALTH CARE EDUCATION/TRAINING PROGRAM

## 2024-11-09 PROCEDURE — 97161 PT EVAL LOW COMPLEX 20 MIN: CPT

## 2024-11-09 RX ORDER — LISINOPRIL 20 MG/1
10 TABLET ORAL DAILY
Qty: 15 TABLET | Refills: 0 | Status: SHIPPED | OUTPATIENT
Start: 2024-11-09 | End: 2024-11-15

## 2024-11-09 RX ADMIN — ASPIRIN 81 MG 81 MG: 81 TABLET ORAL at 08:40

## 2024-11-09 NOTE — THERAPY DISCHARGE NOTE
"Acute Care - Occupational Therapy Discharge  [facility]    Patient Name: Hiral Easley  : 1970    MRN: [id number]                              Today's Date: 2024       Admit Date: 2024    Visit Dx: No diagnosis found.  Patient Active Problem List   Diagnosis    Seasonal allergies    Hyperlipidemia    Complex cyst of left ovary    Chronic migraine without aura without status migrainosus, not intractable    Primary hypertension    Slurred speech    Hypotension     Past Medical History:   Diagnosis Date    Anxiety     Dizziness     Environmental allergies     Hyperlipidemia     Hypertension     Migraine     Stomach problems      Past Surgical History:   Procedure Laterality Date    GALLBLADDER SURGERY      RETINAL DETACHMENT SURGERY  2009    VASCULAR SURGERY        General Information       Row Name 24 1109          OT Time and Intention    Document Type discharge evaluation/summary  -AF     Mode of Treatment occupational therapy  -AF     Patient Effort excellent  -AF     Symptoms Noted During/After Treatment other (see comments)  -AF     Comment complaints of \"head feeling full and funny\" during ambulation. Did not worsen or improve once back to bed.  -AF       Row Name 24 1109          General Information    Patient Profile Reviewed yes  -AF     Prior Level of Function independent:;all household mobility;community mobility;gait;transfer;bed mobility;ADL's;home management;cooking;cleaning;driving;using stairs;shopping;work  -AF     Existing Precautions/Restrictions fall;other (see comments)  recent syncopal episode with fall; monitor BP  -AF     Barriers to Rehab none identified  -AF       Row Name 24 1109          Occupational Profile    Environmental Supports and Barriers (Occupational Profile) Pt lives in 2 story home with SO. Reports prior indp. in all BADLs/IADLs. Pt has no AD at baseline. Pt has tub shower with no seat or grab bars. Pt had fall yesteday during syncope " episode but no falls prior.  -AF       Row Name 11/09/24 1109          Living Environment    People in Home significant other  -AF       Row Name 11/09/24 1109          Home Main Entrance    Number of Stairs, Main Entrance other (see comments)  1 flight  -AF       Row Name 11/09/24 1109          Stairs Within Home, Primary    Stairs, Within Home, Primary 1 flight to upper level, but doesn't have to use. Reports all needs are met on first level.  -AF     Number of Stairs, Within Home, Primary other (see comments)  1 flight  -AF     Stair Railings, Within Home, Primary railing on right side (ascending)  -AF       Row Name 11/09/24 1109          Cognition    Orientation Status (Cognition) oriented x 4  -AF       Row Name 11/09/24 1109          Safety Issues/Impairments Affecting Functional Mobility    Impairments Affecting Function (Mobility) endurance/activity tolerance  -AF               User Key  (r) = Recorded By, (t) = Taken By, (c) = Cosigned By      Initials Name Provider Type    AF Nargis Fournier OT Occupational Therapist                   Mobility/ADL's       Row Name 11/09/24 1112          Bed Mobility    Bed Mobility supine-sit;sit-supine;scooting/bridging  -AF     Scooting/Bridging [place] (Bed Mobility) independent  -AF     Supine-Sit [place] (Bed Mobility) independent  -AF     Sit-Supine [place] (Bed Mobility) independent  -AF     Assistive Device (Bed Mobility) head of bed elevated  -AF     Comment, (Bed Mobility) No physical assistance required. No reports of dizziness upon standing or sitting EOB. BP stable throughout.  -AF       Row Name 11/09/24 1112          Transfers    Transfers sit-stand transfer;stand-sit transfer  -AF       Row Name 11/09/24 1112          Sit-Stand Transfer    Sit-Stand [place] (Transfers) independent  -AF     Comment, (Sit-Stand Transfer) No physical assistance required. No reports of dizziness upon standing or sitting EOB. BP stable throughout.  -AF        [facility] Name 11/09/24 1112          Stand-Sit Transfer    Stand-Sit [place] (Transfers) independent  -AF     Comment, (Stand-Sit Transfer) No physical assistance required. No reports of dizziness upon standing or sitting EOB. BP stable throughout.  -AF       Row Name 11/09/24 1112          Functional Mobility    Functional Mobility- Ind. Level standby assist  -AF     Patient was able to Ambulate yes  -AF       Row Name 11/09/24 1112          Activities of Daily Living    BADL Assessment/Intervention lower body dressing  -AF       Row Name 11/09/24 1112          Lower Body Dressing Assessment/Training    [place] Level (Lower Body Dressing) doff;don;socks;independent  -AF     Position (Lower Body Dressing) edge of bed sitting  -AF               User Key  (r) = Recorded By, (t) = Taken By, (c) = Cosigned By      Initials Name Provider Type    AF Nargis Fournier OT Occupational Therapist                   Obj/Interventions       [facility] Name 11/09/24 1113          Sensory Assessment (Somatosensory)    Sensory Assessment (Somatosensory) UE sensation intact  -AF       Row Name 11/09/24 1113          Vision Assessment/Intervention    Visual Impairment/Limitations WFL;corrective lenses full-time  -AF       [facility] Name 11/09/24 1113          Range of Motion Comprehensive    General Range of Motion bilateral upper extremity ROM WFL  -AF       Row Name 11/09/24 1113          Strength Comprehensive (MMT)    General Manual Muscle Testing (MMT) Assessment no strength deficits identified  -     Comment, General Manual Muscle Testing (MMT) Assessment BUE grossly 5/5.  -[facility] Name 11/09/24 1113          Balance    Balance Assessment sitting static balance;sitting dynamic balance;sit to stand dynamic balance;standing static balance;standing dynamic balance  -AF     Static Sitting Balance independent  -AF     Dynamic Sitting Balance independent  -AF     Position, Sitting Balance unsupported;sitting edge of bed  -AF     Sit to  Stand Dynamic Balance independent  -AF     Static Standing Balance independent  -AF     Dynamic Standing Balance standby assist  -AF     Position/Device Used, Standing Balance unsupported  -AF     Balance Interventions standing;sit to stand;sitting;supported;static;dynamic  -AF     Comment, Balance No overt LOB during dynamic ADL or ambulation.  -AF               User Key  (r) = Recorded By, (t) = Taken By, (c) = Cosigned By      Initials Name Provider Type    AF Fournier, PEDRO LUIS Barillas Occupational Therapist                   Goals/Plan    No documentation.                  Clinical Impression       Row Name 11/09/24 1114          Pain Assessment    Pretreatment Pain Rating 3/10  -AF     Posttreatment Pain Rating 3/10  -AF     Pain Location head  -AF     Pain Side/Orientation anterior;posterior  -AF     Pain Management Interventions exercise or physical activity utilized;positioning techniques utilized;nursing notified  -AF     Response to Pain Interventions activity participation with tolerable pain  -AF       Row Name 11/09/24 1114          Plan of Care Review    Plan of Care Reviewed With patient;significant other  -AF     Outcome Evaluation Pt presents at or very near baseline level of function and was indp. in all functional mobility and BADLs. No IPOT needs ID at this time. Rec. d/c home with assist. Reconsult if change in status occurs.  -AF       Row Name 11/09/24 1114          Vital Signs    Pre Systolic BP Rehab 121  supine  -AF     Pre Treatment Diastolic BP 70  -AF     Intra Systolic BP Rehab 126  EOB  -AF     Intra Treatment Diastolic BP 80  -AF     Post Systolic BP Rehab 133  standing  -AF     Post Treatment Diastolic BP 77  -AF     Pre Patient Position Supine  -AF     Intra Patient Position Standing  -AF     Post Patient Position Supine  -AF       Row Name 11/09/24 1114          Positioning and Restraints    Pre-Treatment Position in bed  -AF     Post Treatment Position bed  -AF     In Bed notified  nsg;call light within reach;supine;encouraged to call for assist;with family/caregiver  -AF               User Key  (r) = Recorded By, (t) = Taken By, (c) = Cosigned By      Initials Name Provider Type    AF Nargis Fournier OT Occupational Therapist                   Outcome Measures       Row Name 11/09/24 1117          How much help from another is currently needed...    Putting on and taking off regular lower body clothing? 4  -AF     Bathing (including washing, rinsing, and drying) 4  -AF     Toileting (which includes using toilet bed pan or urinal) 4  -AF     Putting on and taking off regular upper body clothing 4  -AF     Taking care of personal grooming (such as brushing teeth) 4  -AF     Eating meals 4  -AF     AM-PAC 6 Clicks Score (OT) 24  -AF       Row Name 11/09/24 1118          How much help from another person do you currently need...    Turning from your back to your side while in flat bed without using bedrails? 4  -BA     Moving from lying on back to sitting on the side of a flat bed without bedrails? 4  -BA     Moving to and from a bed to a chair (including a wheelchair)? 4  -BA     Standing up from a chair using your arms (e.g., wheelchair, bedside chair)? 4  -BA     Climbing 3-5 steps with a railing? 3  -BA     To walk in hospital room? 3  -BA     AM-PAC 6 Clicks Score (PT) 22  -BA     Highest Level of Mobility Goal 7 --> Walk 25 feet or more  -BA       Row Name 11/09/24 1118 11/09/24 1117       Modified [place] Scale    Pre-Stroke Modified [place] Scale 3 - Moderate disability.  Requiring some help, but able to walk without assistance.  -BA 3 - Moderate disability.  Requiring some help, but able to walk without assistance.  -AF    Modified [place] Scale 1 - No significant disability despite symptoms.  Able to carry out all usual duties and activities.  -BA 1 - No significant disability despite symptoms.  Able to carry out all usual duties and activities.  -AF      Row Name 11/09/24 1118 11/09/24  1117       Functional Assessment    Outcome Measure Options AM-PAC 6 Clicks Basic Mobility (PT);Modified [place]  -NATY AM-PAC 6 Clicks Daily Activity (OT);Modified [place]  -AF              User Key  (r) = Recorded By, (t) = Taken By, (c) = Cosigned By      Initials Name Provider Type    Ellen Childers, PT Physical Therapist    Nargis Rose OT Occupational Therapist                  Occupational Therapy Education       Title: PT OT SLP Therapies (In Progress)       Topic: Occupational Therapy (In Progress)       Point: ADL training (Done)       Description:   Instruct learner(s) on proper safety adaptation and remediation techniques during self care or transfers.   Instruct in proper use of assistive devices.                  Learning Progress Summary            Patient Acceptance, E,TB, VU by AF at 11/9/2024 1119   Significant Other Acceptance, E,TB, VU by  at 11/9/2024 1119                      Point: Home exercise program (Not Started)       Description:   Instruct learner(s) on appropriate technique for monitoring, assisting and/or progressing therapeutic exercises/activities.                  Learner Progress:  Not documented in this visit.              Point: Precautions (Not Started)       Description:   Instruct learner(s) on prescribed precautions during self-care and functional transfers.                  Learner Progress:  Not documented in this visit.              Point: Body mechanics (Done)       Description:   Instruct learner(s) on proper positioning and spine alignment during self-care, functional mobility activities and/or exercises.                  Learning Progress Summary            Patient Acceptance, E,TB, VU by AF at 11/9/2024 1119   Significant Other Acceptance, E,TB, VU by AF at 11/9/2024 1119                                      User Key       Initials Effective Dates Name Provider Type [facility] 08/15/23 -  Nargis Fournier OT Occupational Therapist OT                  OT  Recommendation and Plan     Plan of Care Review  Plan of Care Reviewed With: patient, significant other  Outcome Evaluation: Pt presents at or very near baseline level of function and was indp. in all functional mobility and BADLs. No IPOT needs ID at this time. Rec. d/c home with assist. Reconsult if change in status occurs.  Plan of Care Reviewed With: patient, significant other  Outcome Evaluation: Pt presents at or very near baseline level of function and was indp. in all functional mobility and BADLs. No IPOT needs ID at this time. Rec. d/c home with assist. Reconsult if change in status occurs.     Time Calculation:   Evaluation Complexity (OT)  Review Occupational Profile/Medical/Therapy History Complexity: brief/low complexity  Assessment, Occupational Performance/Identification of Deficit Complexity: 1-3 performance deficits  Clinical Decision Making Complexity (OT): problem focused assessment/low complexity  Overall Complexity of Evaluation (OT): low complexity     Time Calculation- OT       Row Name 11/09/24 1119             Time Calculation- OT    OT Start Time 1015  -AF      OT Received On 11/09/24  -AF         Untimed Charges    OT Eval/Re-eval Minutes 46  -AF         Total Minutes    Untimed Charges Total Minutes 46  -AF       Total Minutes 46  -AF                User Key  (r) = Recorded By, (t) = Taken By, (c) = Cosigned By      Initials Name Provider Type    AF Nargis Fournier OT Occupational Therapist                  Therapy Charges for Today       Code Description Service Date Service Provider Modifiers Qty    [id number]  OT EVAL LOW COMPLEXITY 4 11/9/2024 Nargis Fournier OT GO 1                    Nargis Fournier OT  11/9/2024

## 2024-11-09 NOTE — PROGRESS NOTES
Stroke Progress Note       Chief Complaint: Syncope or near syncope    Subjective    Subjective     Subjective:  The patient is lying down in the bed in [facility]. Family were at the bedside. The patient stated that she is doing better this morning.  Stated that yesterday before she go to work she felt that her vision was blurry but she said to go to work anyway.  At work she felt that her symptoms are getting worse.  She have similar visual changes before her migraines.  She decided to go back home and by the time she arrived home she is getting of the car she fainted.  The neighbor saw her she said that she was hearing the people calling.  She denies having any unilateral weakness, numbness or speech difficulties.  No other acute complains at this time    Review of Systems   Constitutional: No fatigue      Objective      Temp:  [97.7 °F (36.5 °C)-98.4 °F (36.9 °C)] 98 °F (36.7 °C)  Heart Rate:  [60-89] 77  Resp:  [16-18] 18  BP: (104-119)/(65-75) 116/75    Objective    GEN: lying in bed; in NAD  HENT: normocephalic, non-erythematous oropharynx  CV: no LE edema    NEURO:  Mental Status: A&O x 3, interactive, able to follow commands  Speech: Intact Articulation  CN 2-12:  II - PERRLA  III, IV, VI - EOMI  VII - Brow raise and smile symmetrical  VIII - Auditory acuity intact  XII - Tongue protrudes midline    Motor:  RUE: 5/5  LUE: 5/5  RLE: 5/5  LLE: 5/5    Sensory: intact light touch throughout  Reflexes: negative Perez's sign BL  Coordination: no ataxia with finger-to-nose testing  Gait/Station: deferred     Results Review:    I reviewed the patient's new clinical results.    WBC   Date Value Ref Range Status   11/09/2024 6.65 3.40 - 10.80 10*3/mm3 Final     RBC   Date Value Ref Range Status   11/09/2024 4.06 3.77 - 5.28 10*6/mm3 Final     Hemoglobin   Date Value Ref Range Status   11/09/2024 11.3 (L) 12.0 - 15.9 g/dL Final     Hematocrit   Date Value Ref Range Status   11/09/2024 35.7 34.0 - 46.6 % Final     MCV    Date Value Ref Range Status   11/09/2024 87.9 79.0 - 97.0 fL Final     MCH   Date Value Ref Range Status   11/09/2024 27.8 26.6 - 33.0 pg Final     MCHC   Date Value Ref Range Status   11/09/2024 31.7 31.5 - 35.7 g/dL Final     RDW   Date Value Ref Range Status   11/09/2024 13.9 12.3 - 15.4 % Final     RDW-SD   Date Value Ref Range Status   11/09/2024 44.7 37.0 - 54.0 fl Final     MPV   Date Value Ref Range Status   11/09/2024 9.8 6.0 - 12.0 fL Final     Platelets   Date Value Ref Range Status   11/09/2024 292 140 - 450 10*3/mm3 Final     Neutrophil %   Date Value Ref Range Status   11/09/2024 57.0 42.7 - 76.0 % Final     Lymphocyte %   Date Value Ref Range Status   11/09/2024 27.2 19.6 - 45.3 % Final     Monocyte %   Date Value Ref Range Status   11/09/2024 12.9 (H) 5.0 - 12.0 % Final     Eosinophil %   Date Value Ref Range Status   11/09/2024 1.8 0.3 - 6.2 % Final     Basophil %   Date Value Ref Range Status   11/09/2024 0.6 0.0 - 1.5 % Final     Immature Grans %   Date Value Ref Range Status   11/09/2024 0.5 0.0 - 0.5 % Final     Neutrophils, Absolute   Date Value Ref Range Status   11/09/2024 3.79 1.70 - 7.00 10*3/mm3 Final     Lymphocytes, Absolute   Date Value Ref Range Status   11/09/2024 1.81 0.70 - 3.10 10*3/mm3 Final     Monocytes, Absolute   Date Value Ref Range Status   11/09/2024 0.86 0.10 - 0.90 10*3/mm3 Final     Eosinophils, Absolute   Date Value Ref Range Status   11/09/2024 0.12 0.00 - 0.40 10*3/mm3 Final     Basophils, Absolute   Date Value Ref Range Status   11/09/2024 0.04 0.00 - 0.20 10*3/mm3 Final     Immature Grans, Absolute   Date Value Ref Range Status   11/09/2024 0.03 0.00 - 0.05 10*3/mm3 Final     nRBC   Date Value Ref Range Status   11/09/2024 0.0 0.0 - 0.2 /100 WBC Final       Lab Results   Component Value Date    GLUCOSE 96 11/09/2024    BUN 11 11/09/2024    CREATININE 0.76 11/09/2024     11/09/2024    K 3.9 11/09/2024     11/09/2024    CALCIUM 9.5 11/09/2024    BCR 14.5  11/09/2024    ANIONGAP 10.0 11/09/2024    EGFR 94.4 11/09/2024     MRI Brain Without Contrast    Result Date: 11/9/2024  Impression: 1. No acute ischemic change. 2. Minimal white matter change which is nonspecific but would be compatible with small vessel ischemic disease in this age group Electronically Signed: Milan Hughes MD  11/9/2024 7:03 AM EST  Workstation ID: [id number]     -MRI of the brain images from 11/8/2024 were personally reviewed and showed no evidence of acute ischemic or hemorrhagic stroke.  -Patient LDL from 11/9/2024 was 100  -A1c from 11/9/2024 was 5.4%  -Transthoracic echocardiogram from 11/9/2024 report was personally reviewed and showed ejection fraction of 56 to 60% with a borderline dilated left atrium.  Negative bubble study    Assessment/Plan   This is a 54-year-old female with known medical diagnoses of essential hypertension, hyperlipidemia, anxiety, and ocular migraines (on oxycarbamazepine and Ubrelvy) who presented to her local emergency department today after experiencing a presyncopal episode which was witnessed by her neighbor noted that she had a right facial droop and dysarthria prompting them to contact EMS.  She was evaluated at her local emergency department and underwent a CT of the head without contrast which was negative for hemorrhage and/or acute process.  CTA head/neck was negative for flow-limiting stenosis or LVO.  She was transferred to our facility for higher level of care and further workup.     Antiplatelet PTA: None  Anticoagulant PTA: None           #Syncopal episode with questionable right facial droop and dysarthria  # Migraine with aura  -Differential diagnoses include metabolic disarrangement versus complex migraine versus hypoperfusion 2/2 hypotension versus less likely a TIA.  -MRI of the brain images from 11/8/2024 were personally reviewed and showed no evidence of acute ischemic or hemorrhagic stroke.  -Patient LDL from 11/9/2024 was 100  -A1c from  11/9/2024 was 5.4%  -Transthoracic echocardiogram from 11/9/2024 report was personally reviewed and showed ejection fraction of 56 to 60% with a borderline dilated left atrium.  Negative bubble study    Recommendations  -No need for primary stroke prevention at this time  -Control blood pressure under 140/90 goals  -Recommended Mediterranean diet with a goal for LDL of less than 100.  -Activity as tolerated, fall risk precautions  -PT/OT/SLP evaluation  -Recommend orthostatic vital signs  -Follow-up with outpatient neurologist for further management of her migraine     #Essential hypertension  -Allow autoregulation of blood pressure for adequate cerebral blood flow  -Nicardipine as needed for SBP >220     #Hyperlipidemia  -Lipid panel in AM  -Atorvastatin 80mg nightly        Stroke will continue to follow. Please call for any further questions or concerns  =================================  Lorenzo Brewster MD, Msc, PhD  Vascular Neurologist  [facility]

## 2024-11-09 NOTE — PLAN OF CARE
Goal Outcome Evaluation:  Plan of Care Reviewed With: patient, significant other           Outcome Evaluation: Pt presents at or very near baseline level of function and was indp. in all functional mobility and BADLs. No IPOT needs ID at this time. Rec. d/c home with assist. Reconsult if change in status occurs.

## 2024-11-09 NOTE — PLAN OF CARE
Goal Outcome Evaluation:  Plan of Care Reviewed With: patient, significant other           Outcome Evaluation: PT initial Eval completed.  Pt presents at or very near baseline level of function and was indep with all functional mobility and ambulation.  Ambulated 340ft with SBA and no AD.  Ascended/descended 10 steps with SBA and use of handrail.  Adequate balance and stability throughout with no LOB noted.  Pt with no skilled IP PT needs at this time.  PT will sign off.  Encouraged pt to continue to ambulate and mobilize throughout admission with RN supervision.  Rec HWA upon d/c.    Anticipated Discharge Disposition (PT): home with assist

## 2024-11-09 NOTE — THERAPY DISCHARGE NOTE
Patient Name: Hiral Easley  : 1970    MRN: [id number]                              Today's Date: 2024       Admit Date: 2024    Visit Dx: No diagnosis found.  Patient Active Problem List   Diagnosis    Seasonal allergies    Hyperlipidemia    Complex cyst of left ovary    Chronic migraine without aura without status migrainosus, not intractable    Primary hypertension    Slurred speech    Hypotension     Past Medical History:   Diagnosis Date    Anxiety     Dizziness     Environmental allergies     Hyperlipidemia     Hypertension     Migraine     Stomach problems      Past Surgical History:   Procedure Laterality Date    GALLBLADDER SURGERY      RETINAL DETACHMENT SURGERY  2009    VASCULAR SURGERY        General Information       Row Name 24 1047          Physical Therapy Time and Intention    Document Type discharge evaluation/summary  -BA     Mode of Treatment physical therapy  -BA       Row Name 24 1047          General Information    Patient Profile Reviewed yes  -BA     Prior Level of Function independent:;all household mobility;community mobility;gait;transfer;bed mobility;ADL's;home management;using stairs;driving;work  Reported she did not use an AD for ambulation.  Hx fall just PTA; had syncopal episode.  Currently works.  -BA     Existing Precautions/Restrictions fall;other (see comments)  recent syncopal episode with fall; monitor BP  -BA     Barriers to Rehab none identified  -BA       Row Name 24 1047          Living Environment    People in Home significant other  -BA       Row Name 24 1047          Home Main Entrance    Number of Stairs, Main Entrance other (see comments)  1 flight  -BA     Stair Railings, Main Entrance railings on both sides of stairs  -BA       Row Name 24 1047          Stairs Within Home, Primary    Stairs, Within Home, Primary 1 flight to upper level, but doesn't have to use.  Reported stays on main level.  -BA     Number of  Stairs, Within Home, Primary other (see comments)  1 flight to upper level  -     Stair Railings, Within Home, Primary railing on right side (ascending)  -       Row Name 11/09/24 1047          Cognition    Orientation Status (Cognition) oriented x 3  -       Row Name 11/09/24 1047          Safety Issues/Impairments Affecting Functional Mobility    Impairments Affecting Function (Mobility) endurance/activity tolerance  -               User Key  (r) = Recorded By, (t) = Taken By, (c) = Cosigned By      Initials Name Provider Type     Ellen Yang, PT Physical Therapist                   Mobility       Row Name 11/09/24 1051          Bed Mobility    Bed Mobility supine-sit;sit-supine;scooting/bridging  -     Scooting/Bridging [place] (Bed Mobility) independent  -BA     Supine-Sit [place] (Bed Mobility) independent  -BA     Sit-Supine [place] (Bed Mobility) independent  -     Assistive Device (Bed Mobility) head of bed elevated  -     Comment, (Bed Mobility) No difficulty noted.  Reported no dizziness upon sitting EOB; BP stable.  -       Row Name 11/09/24 1051          Sit-Stand Transfer    Sit-Stand [place] (Transfers) independent  -BA     Comment, (Sit-Stand Transfer) No difficulty noted.  Reported no dizziness upon standing; BP stable.  -       Row Name 11/09/24 1051          Gait/Stairs (Locomotion)    [place] Level (Gait) standby assist  -     Distance in Feet (Gait) 340  -     Deviations/Abnormal Patterns (Gait) bilateral deviations;tavo decreased;gait speed decreased  -BA     [place] Level (Stairs) stand by assist  -     Handrail Location (Stairs) left side (ascending);right side (descending)  -     Number of Steps (Stairs) 10  -BA     Ascending Technique (Stairs) step-over-step  -BA     Descending Technique (Stairs) step-over-step  -BA     Comment, (Gait/Stairs) Demo'd step through gait pattern with mildly decreased tavo at times.  Performed  "dual task activity of head turns and reading signs while ambulating; no LOB noted, but pt with mild decrease in speed and tavo at this time.  Appeared slightly more cautious with gait with head turns.  Denied any dizziness, but reported \"foggy\" feeling during ambulation.  Stair navigation with no LOB noted.  Also reported no change in \"foggy\" feeling.  -               User Key  (r) = Recorded By, (t) = Taken By, (c) = Cosigned By      Initials Name Provider Type     Ellen Yang, PT Physical Therapist                   Obj/Interventions       Row Name 11/09/24 1058          Range of Motion Comprehensive    General Range of Motion bilateral lower extremity ROM WFL  -[facility] Name 11/09/24 1058          Strength Comprehensive (MMT)    General Manual Muscle Testing (MMT) Assessment no strength deficits identified  -     Comment, General Manual Muscle Testing (MMT) Assessment BLE grossly 5/5.  -       Row Name 11/09/24 1058          Motor Skills    Motor Skills functional endurance  -     Functional Endurance Mild decreased functional endurance with more dynamic activity and increased ambulation distances.  -       Row Name 11/09/24 1058          Balance    Balance Assessment sitting static balance;sitting dynamic balance;standing static balance;standing dynamic balance  -     Static Sitting Balance independent  -     Dynamic Sitting Balance independent  -     Position, Sitting Balance unsupported;sitting edge of bed  -     Static Standing Balance independent  -     Dynamic Standing Balance standby assist  -     Position/Device Used, Standing Balance unsupported  -     Comment, Balance Adequate balance with steady gait throughout with ambulation activity and stair navigation with no AD.  Slower gait speed when ambulating while performing head turns.  No LOB noted throughout.  -       Row Name 11/09/24 1058          Sensory Assessment (Somatosensory)    Sensory Assessment " (Somatosensory) LE sensation intact  -               User Key  (r) = Recorded By, (t) = Taken By, (c) = Cosigned By      Initials Name Provider Type    Ellen Childers, PT Physical Therapist                   Goals/Plan    No documentation.                  Clinical Impression       Row Name 11/09/24 1101          Pain    Pretreatment Pain Rating 3/10  -BA     Posttreatment Pain Rating 3/10  -BA     Pain Location head  -BA     Pain Side/Orientation anterior;posterior  -     Pain Management Interventions exercise or physical activity utilized;positioning techniques utilized;nursing notified  -     Response to Pain Interventions activity participation with tolerable pain  -       Row Name 11/09/24 1101          Plan of Care Review    Plan of Care Reviewed With patient;significant other  -     Outcome Evaluation PT initial Eval completed.  Pt presents at or very near baseline level of function and was indep with all functional mobility and ambulation.  Ambulated 340ft with SBA and no AD.  Ascended/descended 10 steps with SBA and use of handrail.  Adequate balance and stability throughout with no LOB noted.  Pt with no skilled IP PT needs at this time.  PT will sign off.  Encouraged pt to continue to ambulate and mobilize throughout admission with RN supervision.  Rec HWA upon d/c.  -       Row Name 11/09/24 1101          Therapy Assessment/Plan (PT)    Criteria for Skilled Interventions Met (PT) no;no problems identified which require skilled intervention  -     Therapy Frequency (PT) evaluation only  -     Predicted Duration of Therapy Intervention (PT) evaluation only  -       Row Name 11/09/24 1101          Vital Signs    Pre Systolic BP Rehab 121  supine  -BA     Pre Treatment Diastolic BP 70  -BA     Intra Systolic BP Rehab 126  sitting EOB; 133/77 standing  -BA     Intra Treatment Diastolic BP 80  -BA     Post Systolic BP Rehab 129  sitting EOB  -BA     Post Treatment Diastolic BP 81  -BA      Pretreatment Heart Rate (beats/min) 56  -BA     Posttreatment Heart Rate (beats/min) 64  -BA     O2 Delivery Pre Treatment room air  -BA     O2 Delivery Intra Treatment room air  -BA     O2 Delivery Post Treatment room air  -BA     Pre Patient Position Supine  -BA     Intra Patient Position Standing  -BA     Post Patient Position Supine  -BA       Row Name 11/09/24 1101          Positioning and Restraints    Pre-Treatment Position in bed  -BA     Post Treatment Position bed  -BA     In Bed notified nsg;fowlers;call light within reach;encouraged to call for assist;with family/caregiver;side rails up x2  RN deferred bed alarm.  -BA               User Key  (r) = Recorded By, (t) = Taken By, (c) = Cosigned By      Initials Name Provider Type    Ellen Childers, PT Physical Therapist                   Outcome Measures       Row Name 11/09/24 1118          How much help from another person do you currently need...    Turning from your back to your side while in flat bed without using bedrails? 4  -BA     Moving from lying on back to sitting on the side of a flat bed without bedrails? 4  -BA     Moving to and from a bed to a chair (including a wheelchair)? 4  -BA     Standing up from a chair using your arms (e.g., wheelchair, bedside chair)? 4  -BA     Climbing 3-5 steps with a railing? 3  -BA     To walk in hospital room? 3  -BA     AM-PAC 6 Clicks Score (PT) 22  -BA     Highest Level of Mobility Goal 7 --> Walk 25 feet or more  -BA       Row Name 11/09/24 1118 11/09/24 1117       Modified [place] Scale    Pre-Stroke Modified [place] Scale 3 - Moderate disability.  Requiring some help, but able to walk without assistance.  -BA 3 - Moderate disability.  Requiring some help, but able to walk without assistance.  -AF    Modified [place] Scale 1 - No significant disability despite symptoms.  Able to carry out all usual duties and activities.  -BA 1 - No significant disability despite symptoms.  Able to carry out all  usual duties and activities.  -AF      Row Name 11/09/24 1118 11/09/24 1117       Functional Assessment    Outcome Measure Options AM-PAC 6 Clicks Basic Mobility (PT);Modified [place]  -NATY AM-PAC 6 Clicks Daily Activity (OT);Modified [place]  -AF              User Key  (r) = Recorded By, (t) = Taken By, (c) = Cosigned By      Initials Name Provider Type     Ellen Yang, JOSTIN Physical Therapist    Nargis Rose OT Occupational Therapist                  Physical Therapy Education       Title: PT OT SLP Therapies (In Progress)       Topic: Physical Therapy (In Progress)       Point: Mobility training (Done)       Learning Progress Summary            Patient Acceptance, E, VU by  at 11/9/2024 1119                      Point: Home exercise program (Not Started)       Learner Progress:  Not documented in this visit.              Point: Body mechanics (Done)       Learning Progress Summary            Patient Acceptance, E, VU by  at 11/9/2024 1119                      Point: Precautions (Done)       Learning Progress Summary            Patient Acceptance, E, VU by  at 11/9/2024 1119                                      User Key       Initials Effective Dates Name Provider Type [facility] 09/21/21 -  Ellen Yang, PT Physical Therapist PT                  PT Recommendation and Plan     Outcome Evaluation: PT initial Eval completed.  Pt presents at or very near baseline level of function and was indep with all functional mobility and ambulation.  Ambulated 340ft with SBA and no AD.  Ascended/descended 10 steps with SBA and use of handrail.  Adequate balance and stability throughout with no LOB noted.  Pt with no skilled IP PT needs at this time.  PT will sign off.  Encouraged pt to continue to ambulate and mobilize throughout admission with RN supervision.  Rec HWA upon d/c.     Time Calculation:   PT Evaluation Complexity  History, PT Evaluation Complexity: 3 or more personal factors and/or  comorbidities  Examination of Body Systems (PT Eval Complexity): total of 3 or more elements  Clinical Presentation (PT Evaluation Complexity): stable  Clinical Decision Making (PT Evaluation Complexity): low complexity  Overall Complexity (PT Evaluation Complexity): low complexity     PT Charges       Row Name 11/09/24 1120             Time Calculation    Start Time 1019  -BA      PT Received On 11/09/24  -BA         Untimed Charges    PT Eval/Re-eval Minutes 55  -BA         Total Minutes    Untimed Charges Total Minutes 55  -BA       Total Minutes 55  -BA                User Key  (r) = Recorded By, (t) = Taken By, (c) = Cosigned By      Initials Name Provider Type    Ellen Childers, PT Physical Therapist                  Therapy Charges for Today       Code Description Service Date Service Provider Modifiers Qty    [id number]  PT EVAL LOW COMPLEXITY 4 11/9/2024 Ellen Yang, PT GP 1            PT G-Codes  Outcome Measure Options: AM-PAC 6 Clicks Basic Mobility (PT), Modified [place]  AM-PAC 6 Clicks Score (PT): 22  AM-PAC 6 Clicks Score (OT): 24  Modified [place] Scale: 1 - No significant disability despite symptoms.  Able to carry out all usual duties and activities.    PT Discharge Summary  Anticipated Discharge Disposition (PT): home with assist  Reason for Discharge: Independent, At baseline function    Ellen Yang PT  11/9/2024

## 2024-11-09 NOTE — DISCHARGE SUMMARY
Psychiatric Medicine Services  DISCHARGE SUMMARY    Patient Name: Hiral Easley  : 1970  MRN: [id number]    Date of Admission: 2024  Date of Discharge: 24  Length of Stay: 0  Primary Care Physician: Chilo Balderrama MD    Consults       No orders found for last 30 day(s).          Hospital Course     Presenting Problem:   Slurred speech [R47.81]      Active Hospital Problems    Diagnosis  POA    **Slurred speech [R47.81]  Yes    Hypotension [I95.9]  Yes    Primary hypertension [I10]  Yes    Chronic migraine without aura without status migrainosus, not intractable [G43.709]  Yes    Hyperlipidemia [E78.5]  Yes      Resolved Hospital Problems   No resolved problems to display.          Hospital Course:  Hiral Easley is a 54 y.o. female ***      Discharge Follow Up Recommendations for labs/diagnostics:   *** in ***, Recommend ***, Re: ***           Day of Discharge     HPI:       Patient denies headaches, fever, chills, sore throat, shortness of breath, chest pain, cough, nausea or vomiting, diarrhea, abdominal pain or distension, joint pain, rash, itching or bleeding.  Vital Signs:   Temp:  [97.7 °F (36.5 °C)-98.4 °F (36.9 °C)] 98 °F (36.7 °C)  Heart Rate:  [60-89] 77  Resp:  [16-18] 18  BP: (104-119)/(65-75) 116/75     Physical Exam:  Patient is alert and talkative in no distress at rest  Neck is without mass or JVD  Heart is Reg wo murmur  Lungs are clear wo wheeze or crackle  Abd is soft without HSM or mass, not tender or distended  MAEW  Skin is without rash  Neurologic exam in nonfocal   Mood is appropriate      Pertinent  and/or Most Recent Results       Results from last 7 days   Lab Units 24  0618 24  1848   WBC 10*3/mm3 6.65 8.51   HEMOGLOBIN g/dL 11.3* 11.5*   HEMATOCRIT % 35.7 35.9   PLATELETS 10*3/mm3 292 327   SODIUM mmol/L 138 141   POTASSIUM mmol/L 3.9 4.0   CHLORIDE mmol/L 102 104   CO2 mmol/L 26.0 25.0   BUN mg/dL 11 10   CREATININE  "mg/dL 0.76 0.69   GLUCOSE mg/dL 96 118*   CALCIUM mg/dL 9.5 9.3           Invalid input(s): \"PROT\", \"LABALBU\"   Results from last 7 days   Lab Units 11/09/24  0618   CHOLESTEROL mg/dL 177   TRIGLYCERIDES mg/dL 188*   HDL CHOL mg/dL 45   LDL CHOL mg/dL 100     Results from last 7 days   Lab Units 11/09/24  0618   HEMOGLOBIN A1C % 5.40     Brief Urine Lab Results       None            No results found for: \"BLOODCX\", \"URINECX\", \"WOUNDCX\", \"MRSACX\", \"RESPCX\", \"STOOLCX\"    MRI Brain Without Contrast    Result Date: 11/9/2024  Impression: Impression: 1. No acute ischemic change. 2. Minimal white matter change which is nonspecific but would be compatible with small vessel ischemic disease in this age group Electronically Signed: Milan Hughes MD  11/9/2024 7:03 AM EST  Workstation ID: [id number]           Discharge Details        Discharge Medications        Changes to Medications        Instructions Start Date   lisinopril 20 MG tablet  Commonly known as: PRINIVIL,ZESTRIL  What changed: how much to take   10 mg, Oral, Daily             Continue These Medications        Instructions Start Date   atorvastatin 20 MG tablet  Commonly known as: LIPITOR   20 mg, Daily      dicyclomine 20 MG tablet  Commonly known as: BENTYL   TAKE 1 TABLET BY MOUTH 4 TIMES DAILY AS NEEDED      fluticasone 50 MCG/ACT nasal spray  Commonly known as: FLONASE   2 sprays, As Needed      loratadine 10 MG tablet  Commonly known as: CLARITIN   10 mg, Oral, Daily      montelukast 10 MG tablet  Commonly known as: SINGULAIR   10 mg, Nightly      OXcarbazepine 150 MG tablet  Commonly known as: TRILEPTAL   150 mg, Oral, 2 Times Daily      ubrogepant 100 MG tablet  Commonly known as: Ubrelvy   100 mg, Oral, Daily PRN, Take at onset of headache - May repeat x 1 in 2 hours if needed (max of 200 mg/ 24 hrs)             Stop These Medications      hydroCHLOROthiazide 25 MG tablet                Discharge Disposition:  Home or Self Care    Discharge " Diet:    Regular  Discharge Activity:    As Tolerated  Special Instructions:    Future Appointments   Date Time Provider Department Center   1/7/2025  3:30 PM Anna Pretty, JELLY, APRN [facility]       Additional Instructions for the Follow-ups that You Need to Schedule       Discharge Follow-up with PCP   As directed       Currently Documented PCP:    Chilo Balderrama MD    PCP Phone Number:    [phone]     Follow Up Details: one week                Time Spent on Discharge:  ***    Electronically signed by Yamileth Solitario MD 11/09/24 09:59 EST

## 2024-11-15 ENCOUNTER — OFFICE VISIT (OUTPATIENT)
Dept: NEUROLOGY | Facility: CLINIC | Age: 54
End: 2024-11-15
Payer: COMMERCIAL

## 2024-11-15 VITALS
OXYGEN SATURATION: 98 % | HEIGHT: 68 IN | BODY MASS INDEX: 29.52 KG/M2 | WEIGHT: 194.8 LBS | DIASTOLIC BLOOD PRESSURE: 70 MMHG | SYSTOLIC BLOOD PRESSURE: 140 MMHG | HEART RATE: 54 BPM

## 2024-11-15 DIAGNOSIS — R55 NEAR SYNCOPE: ICD-10-CM

## 2024-11-15 DIAGNOSIS — R00.1 BRADYCARDIA: ICD-10-CM

## 2024-11-15 DIAGNOSIS — R03.0 ELEVATED BLOOD PRESSURE READING: ICD-10-CM

## 2024-11-15 DIAGNOSIS — G43.C0 PERIODIC HEADACHE SYNDROME, NOT INTRACTABLE: Primary | ICD-10-CM

## 2024-11-15 PROCEDURE — 99214 OFFICE O/P EST MOD 30 MIN: CPT | Performed by: NURSE PRACTITIONER

## 2024-11-15 RX ORDER — ALBUTEROL SULFATE 90 UG/1
INHALANT RESPIRATORY (INHALATION)
COMMUNITY
Start: 2024-08-12

## 2024-11-15 RX ORDER — LISINOPRIL 10 MG/1
10 TABLET ORAL DAILY
COMMUNITY
Start: 2024-08-20

## 2024-11-15 RX ORDER — OXCARBAZEPINE 300 MG/1
300 TABLET, FILM COATED ORAL 2 TIMES DAILY
Qty: 60 TABLET | Refills: 5 | Status: SHIPPED | OUTPATIENT
Start: 2024-11-15

## 2024-11-15 NOTE — PROGRESS NOTES
Neuro Office Visit      Encounter Date: 11/15/2024   Patient Name: Hiral Easley  : 1970   MRN: [id number]   PCP: Dr Balderrama  Chief Complaint:    Chief Complaint   Patient presents with    Headache       History of Present Illness: Hiral Easley is a 54 y.o. female who is here today in Neurology for  periodic headache syndrome and HTN      Last visit 2024- cont ubrelvy prn and oxc 150 bid  History of Present Illness  The patient is a 54-year-old female who presents for evaluation of headache.    HTN/Syncopal episode  DC summary not complete.  H&P by Kerley, Brian Joseph, DO (2024 16:56) -presented with blurred vision, right facial droop and dysarthria. Transferred to [facility]  Her blood pressure today was recorded as 140 systolic. Last weekend, she was hospitalized following a collapse on Friday morning, during which her blood pressure was significantly low at 90 systolic. The hospital advised her to discontinue her diurtetic and anti-hypertensives, and monitor her blood pressure. Her blood pressure remained normal until last night when it spiked, prompting her to take her medication again.    She often feels fatigued and experiences intermittent dizziness.       HA   Headache for 5 days. Unsure if related to bp or not. Recent change in meds after a near syncopal episode       Ubrelvy is effective in 30-60 min with no side effects     Freq: can go weeks without a headache. 4 headaches in last 3 months  Location: retro-orbital and right temple  Quality: dull ache  Duration: Ubrelvy is effective in 30 min  Severity:3/10  Triggers: stress, onion  Assoc sx: Nausea, vomiting, photophobia/phonophobia, dizziness, no tinnitus  Aura:tingling and ear pain  Visual Changes     Abortives: Ubrelvy, imitrex, sumatriptan, tylenol, excedrin, ibuprofen, toradol  Preventives:[facility]     Medical records release PCP to send future labs for sodium     HTN  BP running low.  Stopped amlodipine.     PMH: htn, ocular  migraines       Subjective      Past Medical History:   Past Medical History:   Diagnosis Date    Anxiety     Dizziness     Environmental allergies     Hyperlipidemia     Hypertension     Migraine     Stomach problems     Syncope November 8, 2024       Past Surgical History:   Past Surgical History:   Procedure Laterality Date    GALLBLADDER SURGERY  2010    RETINAL DETACHMENT SURGERY  2009    VASCULAR SURGERY         Family History:   Family History   Problem Relation Age of Onset    Hypertension Mother     Hyperlipidemia Mother     Migraines Sister     Hypertension Sister     Hyperlipidemia Sister     Seizures Maternal Aunt     Stroke Paternal Grandfather     Stroke Paternal Grandmother        Social History:   Social History     Socioeconomic History    Marital status: Single   Tobacco Use    Smoking status: Never    Smokeless tobacco: Never   Vaping Use    Vaping status: Never Used   Substance and Sexual Activity    Alcohol use: Yes     Alcohol/week: 4.0 - 6.0 standard drinks of alcohol     Types: 2 - 3 Glasses of wine, 2 - 3 Cans of beer per week    Drug use: Never    Sexual activity: Yes     Partners: Male       Medications:     Current Outpatient Medications:     albuterol sulfate  (90 Base) MCG/ACT inhaler, , Disp: , Rfl:     atorvastatin (LIPITOR) 20 MG tablet, Take 1 tablet by mouth Daily., Disp: , Rfl:     fluticasone (FLONASE) 50 MCG/ACT nasal spray, Administer 2 sprays into the nostril(s) as directed by provider As Needed for Rhinitis., Disp: , Rfl:     lisinopril (PRINIVIL,ZESTRIL) 10 MG tablet, Take 1 tablet by mouth Daily., Disp: , Rfl:     loratadine (CLARITIN) 10 MG tablet, Take 1 tablet by mouth Daily., Disp: , Rfl:     montelukast (SINGULAIR) 10 MG tablet, Take 1 tablet by mouth Every Night., Disp: , Rfl:     ubrogepant (Ubrelvy) 100 MG tablet, Take 1 tablet by mouth Daily As Needed (migraines). Take at onset of headache - May repeat x 1 in 2 hours if needed (max of 200 mg/ 24 hrs), Disp:  8 tablet, Rfl: 6    OXcarbazepine (TRILEPTAL) 300 MG tablet, Take 1 tablet by mouth 2 (Two) Times a Day., Disp: 60 tablet, Rfl: 5    Allergies:   No Known Allergies    PHQ-9 Total Score:     STEADI Fall Risk Assessment has not been completed.    Objective     Physical Exam:   Physical Exam  Eyes:      General: Lids are normal.      Extraocular Movements: EOM normal. No nystagmus.   Neurological:      Coordination: Romberg sign negative.      Deep Tendon Reflexes:      Reflex Scores:       Bicep reflexes are 2+ on the right side and 2+ on the left side.       Brachioradialis reflexes are 2+ on the right side and 2+ on the left side.       Patellar reflexes are 2+ on the right side and 2+ on the left side.       Achilles reflexes are 2+ on the right side and 2+ on the left side.  Psychiatric:         Speech: Speech normal.         Neurological Exam  Mental Status  Awake, alert and oriented to person, place and time. Recent and remote memory are intact. Speech is normal. Follows three-step commands. Attention and concentration are normal. Fund of knowledge is appropriate for level of education.    Cranial Nerves  CN II: Right visual acuity: Finger movement. Left visual acuity: Finger movement. Right normal visual field. Left normal visual field.  CN III, IV, VI: Extraocular movements intact bilaterally. No nystagmus. Normal saccades. Normal lids and orbits bilaterally.   Right pupil: Round.   Left pupil: Round.  CN V: Facial sensation is normal.  CN VII: Full and symmetric facial movement.  CN IX, X: Palate elevates symmetrically  CN XI: Shoulder shrug strength is normal.  CN XII: Tongue midline without atrophy or fasciculations.    Motor  Normal muscle bulk throughout. No fasciculations present. Normal muscle tone. No abnormal involuntary movements. Strength is 5/5 in all four extremities except as noted. No pronator drift.    Sensory  Sensation is intact to light touch, pinprick, vibration and proprioception in all  "four extremities.    Reflexes                                            Right                      Left  Brachioradialis                    2+                         2+  Biceps                                 2+                         2+  Patellar                                2+                         2+  Achilles                                2+                         2+    Right pathological reflexes: Ankle clonus absent.  Left pathological reflexes: Ankle clonus absent.    Coordination  Right: Finger-to-nose normal. Rapid alternating movement normal. Heel-to-shin normal.    Gait  Normal casual, toe, heel and tandem gait. Romberg is absent. Able to rise from chair without using arms.     Physical Exam  Vital Signs  Heart rate is 54.      Vital Signs:   Vitals:    11/15/24 1113   BP: 140/70   Pulse: 54   SpO2: 98%   Weight: 88.4 kg (194 lb 12.8 oz)   Height: 172.7 cm (67.99\")     Body mass index is 29.63 kg/m².         Assessment / Plan      Assessment/Plan:   Diagnoses and all orders for this visit:    1. Periodic headache syndrome, not intractable (Primary)  Comments:  Pharmacy to change ubrelvy to [employer]  bid.  Orders:  -     OXcarbazepine (TRILEPTAL) 300 MG tablet; Take 1 tablet by mouth 2 (Two) Times a Day.  Dispense: 60 tablet; Refill: 5    2. Elevated blood pressure reading  Comments:  Record bp and pulse  Orders:  -     Ambulatory Referral to Cardiology    3. Bradycardia  -     Ambulatory Referral to Cardiology    4. Near syncope  -     Ambulatory Referral to Cardiology       Assessment & Plan  1. Headache.  Her CT and CTA head scans were negative, and her blood pressure responded well to a 1 L migraine cocktail. There is evidence of white matter disease, which is common in individuals with headaches, aging, and high blood pressure. She has been experiencing bradycardia since January 2024, with her pulse currently low at 54. Her sodium levels are within the normal range. A prescription " for Nurtec will be sent to the local neuro pharmacy. The dosage of oxcarbazepine will be increased to 300 mg twice daily. She is advised to monitor her blood pressure and pulse regularly. If she experiences increased dizziness or imbalance, her sodium levels should be checked immediately. Her sodium level will be checked after 6 weeks of treatment.    2. Low heart rate (Bradycardia).  She has been experiencing bradycardia with a pulse of 54. A referral to cardiology will be made to address her low heart rate, blood pressure, and near syncopal spell. She is advised to keep a record of her pulse and blood pressure.    3. Medication Management.  She is currently on oxcarbazepine, Ubrelvy, lisinopril, and atorvastatin. The hospital instructed her to stop taking her water pill due to low blood pressure. She will switch from Ubrelvy to Nurtec for her migraines, as her insurance no longer covers Ubrelvy. The local neuro pharmacy will assist in filling the prescription for Nurtec.          Patient Education:       Reviewed medications, potential side effects and signs and symptoms to report. Discussed risk versus benefits of treatment plan with patient and/or family-including medications, labs and radiology that may be ordered. Addressed questions and concerns during visit. Patient and/or family verbalized understanding and agree with plan. Instructed to call the office with any questions and report to ER with any life-threatening symptoms.     Follow Up:   Return in about 3 months (around 2/15/2025) for Recheck.    During this visit the following were done:  Labs Reviewed [x]    Labs Ordered []    Radiology Reports Reviewed [x]    Radiology Ordered []    PCP Records Reviewed []    Referring Provider Records Reviewed [x]    ER Records Reviewed []    Hospital Records Reviewed []    History Obtained From Family []    Radiology Images Reviewed [x]    Other Reviewed [x]    Records Requested []      Patient or patient  representative verbalized consent for the use of Ambient Listening during the visit with  Anna Pretty DNP, KERRY for chart documentation. 11/15/2024  11:20 EST      Anna Pretty DNP, APRN

## 2024-11-15 NOTE — LETTER
November 15, 2024     Chilo Balderrama MD  [address] [place] [address]    Patient: Hiral Easley   YOB: 1970   Date of Visit: 11/15/2024     Dear Chilo Balderrama MD:       Thank you for referring Hiral Easley to me for evaluation. Below are the relevant portions of my assessment and plan of care.    If you have questions, please do not hesitate to call me. I look forward to following Hiral along with you.         Sincerely,        Anna Pretty DNP, APRN        CC: No Recipients    Anna Pretty DNP, APRN  11/15/24 1436  Signed     Neuro Office Visit      Encounter Date: 11/15/2024   Patient Name: Hiral Easley  : 1970   MRN: [id number]   PCP: Dr Balderrama  Chief Complaint:    Chief Complaint   Patient presents with   • Headache       History of Present Illness: Hiral Easley is a 54 y.o. female who is here today in Neurology for  periodic headache syndrome and HTN      Last visit 2024- cont ubrelvy prn and oxc 150 bid  History of Present Illness  The patient is a 54-year-old female who presents for evaluation of headache.    HTN/Syncopal episode  DC summary not complete.  H&P by Kerley, Brian Joseph, DO (2024 16:56) -presented with blurred vision, right facial droop and dysarthria. Transferred to [facility]  Her blood pressure today was recorded as 140 systolic. Last weekend, she was hospitalized following a collapse on Friday morning, during which her blood pressure was significantly low at 90 systolic. The hospital advised her to discontinue her diurtetic and anti-hypertensives, and monitor her blood pressure. Her blood pressure remained normal until last night when it spiked, prompting her to take her medication again.    She often feels fatigued and experiences intermittent dizziness.       HA   Headache for 5 days. Unsure if related to bp or not. Recent change in meds after a near syncopal episode       Ubrelvy is effective in 30-60 min  with no side effects     Freq: can go weeks without a headache. 4 headaches in last 3 months  Location: retro-orbital and right temple  Quality: dull ache  Duration: Ubrelvy is effective in 30 min  Severity:3/10  Triggers: stress, onion  Assoc sx: Nausea, vomiting, photophobia/phonophobia, dizziness, no tinnitus  Aura:tingling and ear pain  Visual Changes     Abortives: Ubrelvy, imitrex, sumatriptan, tylenol, excedrin, ibuprofen, toradol  Preventives:[facility]     Medical records release PCP to send future labs for sodium     HTN  BP running low.  Stopped amlodipine.     PMH: htn, ocular migraines       Subjective      Past Medical History:   Past Medical History:   Diagnosis Date   • Anxiety    • Dizziness    • Environmental allergies    • Hyperlipidemia    • Hypertension    • Migraine    • Stomach problems    • Syncope November 8, 2024       Past Surgical History:   Past Surgical History:   Procedure Laterality Date   • GALLBLADDER SURGERY  2010   • RETINAL DETACHMENT SURGERY  2009   • VASCULAR SURGERY         Family History:   Family History   Problem Relation Age of Onset   • Hypertension Mother    • Hyperlipidemia Mother    • Migraines Sister    • Hypertension Sister    • Hyperlipidemia Sister    • Seizures Maternal Aunt    • Stroke Paternal Grandfather    • Stroke Paternal Grandmother        Social History:   Social History     Socioeconomic History   • Marital status: Single   Tobacco Use   • Smoking status: Never   • Smokeless tobacco: Never   Vaping Use   • Vaping status: Never Used   Substance and Sexual Activity   • Alcohol use: Yes     Alcohol/week: 4.0 - 6.0 standard drinks of alcohol     Types: 2 - 3 Glasses of wine, 2 - 3 Cans of beer per week   • Drug use: Never   • Sexual activity: Yes     Partners: Male       Medications:     Current Outpatient Medications:   •  albuterol sulfate  (90 Base) MCG/ACT inhaler, , Disp: , Rfl:   •  atorvastatin (LIPITOR) 20 MG tablet, Take 1 tablet by mouth Daily.,  Disp: , Rfl:   •  fluticasone (FLONASE) 50 MCG/ACT nasal spray, Administer 2 sprays into the nostril(s) as directed by provider As Needed for Rhinitis., Disp: , Rfl:   •  lisinopril (PRINIVIL,ZESTRIL) 10 MG tablet, Take 1 tablet by mouth Daily., Disp: , Rfl:   •  loratadine (CLARITIN) 10 MG tablet, Take 1 tablet by mouth Daily., Disp: , Rfl:   •  montelukast (SINGULAIR) 10 MG tablet, Take 1 tablet by mouth Every Night., Disp: , Rfl:   •  ubrogepant (Ubrelvy) 100 MG tablet, Take 1 tablet by mouth Daily As Needed (migraines). Take at onset of headache - May repeat x 1 in 2 hours if needed (max of 200 mg/ 24 hrs), Disp: 8 tablet, Rfl: 6  •  OXcarbazepine (TRILEPTAL) 300 MG tablet, Take 1 tablet by mouth 2 (Two) Times a Day., Disp: 60 tablet, Rfl: 5    Allergies:   No Known Allergies    PHQ-9 Total Score:     STEADI Fall Risk Assessment has not been completed.    Objective     Physical Exam:   Physical Exam  Eyes:      General: Lids are normal.      Extraocular Movements: EOM normal. No nystagmus.   Neurological:      Coordination: Romberg sign negative.      Deep Tendon Reflexes:      Reflex Scores:       Bicep reflexes are 2+ on the right side and 2+ on the left side.       Brachioradialis reflexes are 2+ on the right side and 2+ on the left side.       Patellar reflexes are 2+ on the right side and 2+ on the left side.       Achilles reflexes are 2+ on the right side and 2+ on the left side.  Psychiatric:         Speech: Speech normal.         Neurological Exam  Mental Status  Awake, alert and oriented to person, place and time. Recent and remote memory are intact. Speech is normal. Follows three-step commands. Attention and concentration are normal. Fund of knowledge is appropriate for level of education.    Cranial Nerves  CN II: Right visual acuity: Finger movement. Left visual acuity: Finger movement. Right normal visual field. Left normal visual field.  CN III, IV, VI: Extraocular movements intact bilaterally.  "No nystagmus. Normal saccades. Normal lids and orbits bilaterally.   Right pupil: Round.   Left pupil: Round.  CN V: Facial sensation is normal.  CN VII: Full and symmetric facial movement.  CN IX, X: Palate elevates symmetrically  CN XI: Shoulder shrug strength is normal.  CN XII: Tongue midline without atrophy or fasciculations.    Motor  Normal muscle bulk throughout. No fasciculations present. Normal muscle tone. No abnormal involuntary movements. Strength is 5/5 in all four extremities except as noted. No pronator drift.    Sensory  Sensation is intact to light touch, pinprick, vibration and proprioception in all four extremities.    Reflexes                                            Right                      Left  Brachioradialis                    2+                         2+  Biceps                                 2+                         2+  Patellar                                2+                         2+  Achilles                                2+                         2+    Right pathological reflexes: Ankle clonus absent.  Left pathological reflexes: Ankle clonus absent.    Coordination  Right: Finger-to-nose normal. Rapid alternating movement normal. Heel-to-shin normal.    Gait  Normal casual, toe, heel and tandem gait. Romberg is absent. Able to rise from chair without using arms.     Physical Exam  Vital Signs  Heart rate is 54.      Vital Signs:   Vitals:    11/15/24 1113   BP: 140/70   Pulse: 54   SpO2: 98%   Weight: 88.4 kg (194 lb 12.8 oz)   Height: 172.7 cm (67.99\")     Body mass index is 29.63 kg/m².         Assessment / Plan      Assessment/Plan:   Diagnoses and all orders for this visit:    1. Periodic headache syndrome, not intractable (Primary)  Comments:  Pharmacy to change ubrelvy to Nurtec. [facility]  bid.  Orders:  -     OXcarbazepine (TRILEPTAL) 300 MG tablet; Take 1 tablet by mouth 2 (Two) Times a Day.  Dispense: 60 tablet; Refill: 5    2. Elevated blood pressure " reading  Comments:  Record bp and pulse  Orders:  -     Ambulatory Referral to Cardiology    3. Bradycardia  -     Ambulatory Referral to Cardiology    4. Near syncope  -     Ambulatory Referral to Cardiology       Assessment & Plan  1. Headache.  Her CT and CTA head scans were negative, and her blood pressure responded well to a 1 L migraine cocktail. There is evidence of white matter disease, which is common in individuals with headaches, aging, and high blood pressure. She has been experiencing bradycardia since January 2024, with her pulse currently low at 54. Her sodium levels are within the normal range. A prescription for Nurtec will be sent to the local neuro pharmacy. The dosage of oxcarbazepine will be increased to 300 mg twice daily. She is advised to monitor her blood pressure and pulse regularly. If she experiences increased dizziness or imbalance, her sodium levels should be checked immediately. Her sodium level will be checked after 6 weeks of treatment.    2. Low heart rate (Bradycardia).  She has been experiencing bradycardia with a pulse of 54. A referral to cardiology will be made to address her low heart rate, blood pressure, and near syncopal spell. She is advised to keep a record of her pulse and blood pressure.    3. Medication Management.  She is currently on oxcarbazepine, Ubrelvy, lisinopril, and atorvastatin. The hospital instructed her to stop taking her water pill due to low blood pressure. She will switch from Ubrelvy to Nurtec for her migraines, as her insurance no longer covers Ubrelvy. The local neuro pharmacy will assist in filling the prescription for Nurtec.          Patient Education:       Reviewed medications, potential side effects and signs and symptoms to report. Discussed risk versus benefits of treatment plan with patient and/or family-including medications, labs and radiology that may be ordered. Addressed questions and concerns during visit. Patient and/or family  verbalized understanding and agree with plan. Instructed to call the office with any questions and report to ER with any life-threatening symptoms.     Follow Up:   Return in about 3 months (around 2/15/2025) for Recheck.    During this visit the following were done:  Labs Reviewed [x]    Labs Ordered []    Radiology Reports Reviewed [x]    Radiology Ordered []    PCP Records Reviewed []    Referring Provider Records Reviewed [x]    ER Records Reviewed []    Hospital Records Reviewed []    History Obtained From Family []    Radiology Images Reviewed [x]    Other Reviewed [x]    Records Requested []      Patient or patient representative verbalized consent for the use of Ambient Listening during the visit with  Anna Pretty DNP, APRN for chart documentation. 11/15/2024  11:20 EST      Anna Pretty DNP, APRN

## 2024-11-21 ENCOUNTER — SPECIALTY PHARMACY (OUTPATIENT)
Dept: ONCOLOGY | Facility: HOSPITAL | Age: 54
End: 2024-11-21
Payer: COMMERCIAL

## 2024-11-22 RX ORDER — RIMEGEPANT SULFATE 75 MG/75MG
1 TABLET, ORALLY DISINTEGRATING ORAL DAILY PRN
Qty: 16 TABLET | Refills: 11 | Status: SHIPPED | OUTPATIENT
Start: 2024-11-22

## 2024-12-03 ENCOUNTER — OFFICE VISIT (OUTPATIENT)
Dept: CARDIOLOGY | Facility: CLINIC | Age: 54
End: 2024-12-03
Payer: COMMERCIAL

## 2024-12-03 VITALS
HEIGHT: 68 IN | DIASTOLIC BLOOD PRESSURE: 80 MMHG | SYSTOLIC BLOOD PRESSURE: 124 MMHG | BODY MASS INDEX: 28.55 KG/M2 | OXYGEN SATURATION: 98 % | WEIGHT: 188.4 LBS

## 2024-12-03 DIAGNOSIS — I10 PRIMARY HYPERTENSION: ICD-10-CM

## 2024-12-03 DIAGNOSIS — R55 SYNCOPE AND COLLAPSE: ICD-10-CM

## 2024-12-03 DIAGNOSIS — E78.00 PURE HYPERCHOLESTEROLEMIA: Primary | ICD-10-CM

## 2024-12-03 PROCEDURE — 99204 OFFICE O/P NEW MOD 45 MIN: CPT | Performed by: INTERNAL MEDICINE

## 2024-12-03 RX ORDER — HYDROCHLOROTHIAZIDE 25 MG/1
25 TABLET ORAL
COMMUNITY

## 2024-12-03 NOTE — PROGRESS NOTES
New Cardiology Patient Office Visit      Date: 2024  Patient Name: Hiral Easley  : 1970   MRN: [id number]   PCP: Chilo Balderrama MD   Referring Provider: No ref. provider found     Chief Complaint:    Chief Complaint   Patient presents with    Loss of Consciousness       History of Present Illness: Hiral Easley is a 54 y.o. female who is here today for evaluation of dizziness/syncope.    Patient is otherwise healthy was working in her office.  She is starting feeling little dizzy and nauseated.  She drove to home and passed out.  Her neighbor helped her to get to the house.  She was looking pale.  She denies any loss of sphincter tones.  She denies any lower extremity edema.  She denies any headaches or migraine that day.    She denies any upper respiratory tract infection pain, claudication, weight loss or weight gain.      Problem List   CARDIAC  Coronary Artery Disease:   The 10-year ASCVD risk score (Rogelio POTTER, et al., 2019) is: 2%    Myocardium:   Dizziness     Valvular:   No known valvular disease     Electrical:   NSR      Pericardium:   Normal     CARDIAC RISK FACTORS  Hypertension  Dyslipidemia  Menopausal state - Post-menopausal    NON-CARDIAC  Vericose vein    SURGERIES  Cholecystectomy  Retinal detachment surgery  Varicose vein surgery    Subjective      Review of Systems:   Review of Systems   Neurological:  Positive for syncope.       Medications:   Current Outpatient Medications   Medication Sig Dispense Refill    albuterol sulfate  (90 Base) MCG/ACT inhaler       atorvastatin (LIPITOR) 20 MG tablet Take 1 tablet by mouth Daily.      fluticasone (FLONASE) 50 MCG/ACT nasal spray Administer 2 sprays into the nostril(s) as directed by provider As Needed for Rhinitis.      hydroCHLOROthiazide 25 MG tablet 1 tablet.      lisinopril (PRINIVIL,ZESTRIL) 10 MG tablet Take 1 tablet by mouth Daily.      montelukast (SINGULAIR) 10 MG tablet Take 1 tablet by mouth Every  "Night.      OXcarbazepine (TRILEPTAL) 300 MG tablet Take 1 tablet by mouth 2 (Two) Times a Day. 60 tablet 5    Rimegepant Sulfate (Nurtec) 75 MG tablet dispersible tablet Take 1 tablet by mouth Daily As Needed (at onset of headache). Max of 75 mg/24 hours, Max of 18 tabs/30 days. 16 tablet 11     No current facility-administered medications for this visit.           The following portions of the patient's history were reviewed and updated as appropriate: allergies, current medications, past family history, past medical history, past social history, past surgical history and problem list.    Objective   Vital Signs:   Vitals:    12/03/24 1457   BP: 124/80   BP Location: Right arm   Patient Position: Sitting   Cuff Size: Adult   SpO2: 98%   Weight: 85.5 kg (188 lb 6.4 oz)   Height: 172.7 cm (68\")     Body mass index is 28.65 kg/m².     Physical Exam:  Constitutional:       General: Not in acute distress.     Appearance: Healthy appearance. Not in distress.     Neck:     JVP:Not elevated     Carotid artery: Normal    Pulmonary:      Effort: Pulmonary effort is normal.      Breath sounds: Normal breath sounds. No wheezing. No rhonchi. No rales.     Cardiovascular:      Normal rate. Regular rhythm. Normal S1. Normal S2.      Murmurs: There is no murmur.      No gallop. No click. No rub.     Abdominal:      General: Bowel sounds are normal.      Palpations: Abdomen is soft.      Tenderness: There is no abdominal tenderness.    Extremities:     Pulses:Normal radial and pedal pulses     Edema:no edema      Labs:  Lab Results   Component Value Date    GLUCOSE 96 11/09/2024    BUN 11 11/09/2024    CREATININE 0.76 11/09/2024    BCR 14.5 11/09/2024    K 3.9 11/09/2024    CO2 26.0 11/09/2024    CALCIUM 9.5 11/09/2024     Lab Results   Component Value Date    WBC 6.65 11/09/2024    HGB 11.3 (L) 11/09/2024    HCT 35.7 11/09/2024    MCV 87.9 11/09/2024     11/09/2024     Lab Results   Component Value Date    CHOL 177 " "11/09/2024    TRIG 188 (H) 11/09/2024    HDL 45 11/09/2024     11/09/2024     No results found for: \"TSH\"  Lab Results   Component Value Date    HGBA1C 5.40 11/09/2024         Smoking Cessation:   Tobacco Product History : Patient never smoked    Advance Care Planning   ACP discussion was declined by the patient. Patient does not have an advance directive, declines further assistance.            Assessment / Plan      Assessment:   Diagnosis Plan   1. Pure hypercholesterolemia  Stress Test With Myocardial Perfusion One Day    Holter Monitor - 72 Hour Up To 15 Days      2. Primary hypertension        3. Syncope and collapse             Plan:  Patient cholesterol has been under good control.  Due to multiple medical problems we will go ahead and do a stress test to rule out any underlying coronary artery disease.  We will also do Holter monitor to rule out any arrhythmia problem.  She has started back on medication as her blood pressure has started going up.  I have advised her to keep checking her blood pressure and she needs to keep it around 120/80 if possible.            Follow Up:   Return in about 3 months (around 3/3/2025).    Oneil May MD    "

## 2024-12-17 DIAGNOSIS — R55 SYNCOPE AND COLLAPSE: ICD-10-CM

## 2024-12-17 DIAGNOSIS — R42 DIZZINESS: Primary | ICD-10-CM

## 2024-12-23 ENCOUNTER — HOSPITAL ENCOUNTER (OUTPATIENT)
Facility: HOSPITAL | Age: 54
Discharge: HOME | End: 2024-12-23
Admitting: INTERNAL MEDICINE
Payer: COMMERCIAL

## 2024-12-23 DIAGNOSIS — R42 DIZZINESS: ICD-10-CM

## 2024-12-23 DIAGNOSIS — R55 SYNCOPE AND COLLAPSE: ICD-10-CM

## 2024-12-23 LAB
BH CV STRESS BP STAGE 1: NORMAL
BH CV STRESS BP STAGE 2: NORMAL
BH CV STRESS BP STAGE 3: NORMAL
BH CV STRESS DURATION MIN STAGE 1: 3
BH CV STRESS DURATION MIN STAGE 2: 3
BH CV STRESS DURATION MIN STAGE 3: 3
BH CV STRESS DURATION SEC STAGE 1: 0
BH CV STRESS DURATION SEC STAGE 2: 0
BH CV STRESS DURATION SEC STAGE 3: 0
BH CV STRESS GRADE STAGE 1: 10
BH CV STRESS GRADE STAGE 2: 12
BH CV STRESS GRADE STAGE 3: 14
BH CV STRESS HR STAGE 1: 144
BH CV STRESS HR STAGE 2: 166
BH CV STRESS HR STAGE 3: 173
BH CV STRESS METS STAGE 1: 5
BH CV STRESS METS STAGE 2: 7.5
BH CV STRESS METS STAGE 3: 10
BH CV STRESS O2 STAGE 1: 98
BH CV STRESS O2 STAGE 2: 99
BH CV STRESS O2 STAGE 3: 99
BH CV STRESS PROTOCOL 1: NORMAL
BH CV STRESS RECOVERY BP: NORMAL MMHG
BH CV STRESS RECOVERY HR: 108 BPM
BH CV STRESS RECOVERY O2: 98 %
BH CV STRESS SPEED STAGE 1: 1.7
BH CV STRESS SPEED STAGE 2: 2.5
BH CV STRESS SPEED STAGE 3: 3.4
BH CV STRESS STAGE 1: 1
BH CV STRESS STAGE 2: 2
BH CV STRESS STAGE 3: 3
MAXIMAL PREDICTED HEART RATE: 168 BPM
PERCENT MAX PREDICTED HR: 104.76 %
STRESS BASELINE BP: NORMAL MMHG
STRESS BASELINE HR: 84 BPM
STRESS O2 SAT REST: 98 %
STRESS PERCENT HR: 123 %
STRESS POST ESTIMATED WORKLOAD: 10.1 METS
STRESS POST EXERCISE DUR MIN: 9 MIN
STRESS POST EXERCISE DUR SEC: 0 SEC
STRESS POST O2 SAT PEAK: 99 %
STRESS POST PEAK BP: NORMAL MMHG
STRESS POST PEAK HR: 176 BPM
STRESS TARGET HR: 143 BPM

## 2024-12-23 PROCEDURE — 93017 CV STRESS TEST TRACING ONLY: CPT

## 2024-12-31 ENCOUNTER — HOSPITAL ENCOUNTER (EMERGENCY)
Facility: HOSPITAL | Age: 54
Discharge: HOME | End: 2024-12-31
Attending: EMERGENCY MEDICINE | Admitting: EMERGENCY MEDICINE
Payer: COMMERCIAL

## 2024-12-31 ENCOUNTER — TELEPHONE (OUTPATIENT)
Dept: CARDIOLOGY | Facility: CLINIC | Age: 54
End: 2024-12-31

## 2024-12-31 VITALS
TEMPERATURE: 97.6 F | RESPIRATION RATE: 18 BRPM | HEART RATE: 67 BPM | HEIGHT: 68 IN | WEIGHT: 180 LBS | OXYGEN SATURATION: 96 % | DIASTOLIC BLOOD PRESSURE: 92 MMHG | BODY MASS INDEX: 27.28 KG/M2 | SYSTOLIC BLOOD PRESSURE: 107 MMHG

## 2024-12-31 DIAGNOSIS — R55 SYNCOPE AND COLLAPSE: Primary | ICD-10-CM

## 2024-12-31 DIAGNOSIS — E87.1 ACUTE HYPONATREMIA: ICD-10-CM

## 2024-12-31 LAB
ALBUMIN SERPL-MCNC: 4 G/DL (ref 3.5–5.2)
ALBUMIN/GLOB SERPL: 1.5 G/DL
ALP SERPL-CCNC: 101 U/L (ref 39–117)
ALT SERPL W P-5'-P-CCNC: 20 U/L (ref 1–33)
ANION GAP SERPL CALCULATED.3IONS-SCNC: 14 MMOL/L (ref 5–15)
AST SERPL-CCNC: 19 U/L (ref 1–32)
BASOPHILS # BLD AUTO: 0.03 10*3/MM3 (ref 0–0.2)
BASOPHILS NFR BLD AUTO: 0.6 % (ref 0–1.5)
BILIRUB SERPL-MCNC: 0.2 MG/DL (ref 0–1.2)
BUN SERPL-MCNC: 12 MG/DL (ref 6–20)
BUN/CREAT SERPL: 18.5 (ref 7–25)
CALCIUM SPEC-SCNC: 9.1 MG/DL (ref 8.6–10.5)
CHLORIDE SERPL-SCNC: 88 MMOL/L (ref 98–107)
CO2 SERPL-SCNC: 23 MMOL/L (ref 22–29)
CREAT SERPL-MCNC: 0.65 MG/DL (ref 0.57–1)
DEPRECATED RDW RBC AUTO: 44.8 FL (ref 37–54)
EGFRCR SERPLBLD CKD-EPI 2021: 106.1 ML/MIN/1.73
EOSINOPHIL # BLD AUTO: 0.07 10*3/MM3 (ref 0–0.4)
EOSINOPHIL NFR BLD AUTO: 1.3 % (ref 0.3–6.2)
ERYTHROCYTE [DISTWIDTH] IN BLOOD BY AUTOMATED COUNT: 14.6 % (ref 12.3–15.4)
GEN 5 1HR TROPONIN T REFLEX: <6 NG/L
GLOBULIN UR ELPH-MCNC: 2.6 GM/DL
GLUCOSE SERPL-MCNC: 136 MG/DL (ref 65–99)
HCT VFR BLD AUTO: 33.3 % (ref 34–46.6)
HGB BLD-MCNC: 11.1 G/DL (ref 12–15.9)
HOLD SPECIMEN: NORMAL
IMM GRANULOCYTES # BLD AUTO: 0.02 10*3/MM3 (ref 0–0.05)
IMM GRANULOCYTES NFR BLD AUTO: 0.4 % (ref 0–0.5)
LYMPHOCYTES # BLD AUTO: 1.4 10*3/MM3 (ref 0.7–3.1)
LYMPHOCYTES NFR BLD AUTO: 26.1 % (ref 19.6–45.3)
MAGNESIUM SERPL-MCNC: 1.7 MG/DL (ref 1.6–2.6)
MCH RBC QN AUTO: 28.1 PG (ref 26.6–33)
MCHC RBC AUTO-ENTMCNC: 33.3 G/DL (ref 31.5–35.7)
MCV RBC AUTO: 84.3 FL (ref 79–97)
MONOCYTES # BLD AUTO: 0.42 10*3/MM3 (ref 0.1–0.9)
MONOCYTES NFR BLD AUTO: 7.8 % (ref 5–12)
NEUTROPHILS NFR BLD AUTO: 3.42 10*3/MM3 (ref 1.7–7)
NEUTROPHILS NFR BLD AUTO: 63.8 % (ref 42.7–76)
NRBC BLD AUTO-RTO: 0 /100 WBC (ref 0–0.2)
PLATELET # BLD AUTO: 284 10*3/MM3 (ref 140–450)
PMV BLD AUTO: 9.1 FL (ref 6–12)
POTASSIUM SERPL-SCNC: 3.2 MMOL/L (ref 3.5–5.2)
PROT SERPL-MCNC: 6.6 G/DL (ref 6–8.5)
RBC # BLD AUTO: 3.95 10*6/MM3 (ref 3.77–5.28)
SODIUM SERPL-SCNC: 125 MMOL/L (ref 136–145)
TROPONIN T NUMERIC DELTA: NORMAL
TROPONIN T SERPL HS-MCNC: <6 NG/L
WBC NRBC COR # BLD AUTO: 5.36 10*3/MM3 (ref 3.4–10.8)
WHOLE BLOOD HOLD COAG: NORMAL
WHOLE BLOOD HOLD SPECIMEN: NORMAL

## 2024-12-31 PROCEDURE — 96374 THER/PROPH/DIAG INJ IV PUSH: CPT

## 2024-12-31 PROCEDURE — 36415 COLL VENOUS BLD VENIPUNCTURE: CPT

## 2024-12-31 PROCEDURE — 25810000003 SODIUM CHLORIDE 0.9 % SOLUTION: Performed by: EMERGENCY MEDICINE

## 2024-12-31 PROCEDURE — 85025 COMPLETE CBC W/AUTO DIFF WBC: CPT | Performed by: EMERGENCY MEDICINE

## 2024-12-31 PROCEDURE — 93005 ELECTROCARDIOGRAM TRACING: CPT | Performed by: EMERGENCY MEDICINE

## 2024-12-31 PROCEDURE — 80053 COMPREHEN METABOLIC PANEL: CPT | Performed by: EMERGENCY MEDICINE

## 2024-12-31 PROCEDURE — 83735 ASSAY OF MAGNESIUM: CPT | Performed by: EMERGENCY MEDICINE

## 2024-12-31 PROCEDURE — 84484 ASSAY OF TROPONIN QUANT: CPT | Performed by: EMERGENCY MEDICINE

## 2024-12-31 PROCEDURE — 25010000002 ONDANSETRON PER 1 MG: Performed by: EMERGENCY MEDICINE

## 2024-12-31 PROCEDURE — 99283 EMERGENCY DEPT VISIT LOW MDM: CPT

## 2024-12-31 RX ORDER — SODIUM CHLORIDE 1 G/1
2 TABLET ORAL ONCE
Status: COMPLETED | OUTPATIENT
Start: 2024-12-31 | End: 2024-12-31

## 2024-12-31 RX ORDER — ONDANSETRON 2 MG/ML
4 INJECTION INTRAMUSCULAR; INTRAVENOUS ONCE
Status: COMPLETED | OUTPATIENT
Start: 2024-12-31 | End: 2024-12-31

## 2024-12-31 RX ORDER — SODIUM CHLORIDE 1 G/1
1 TABLET ORAL 3 TIMES DAILY
Qty: 21 TABLET | Refills: 0 | Status: SHIPPED | OUTPATIENT
Start: 2024-12-31

## 2024-12-31 RX ORDER — SODIUM CHLORIDE 0.9 % (FLUSH) 0.9 %
10 SYRINGE (ML) INJECTION AS NEEDED
Status: DISCONTINUED | OUTPATIENT
Start: 2024-12-31 | End: 2025-01-01 | Stop reason: HOSPADM

## 2024-12-31 RX ORDER — DICYCLOMINE HYDROCHLORIDE 10 MG/1
20 CAPSULE ORAL ONCE
Status: COMPLETED | OUTPATIENT
Start: 2024-12-31 | End: 2024-12-31

## 2024-12-31 RX ADMIN — ONDANSETRON 4 MG: 2 INJECTION INTRAMUSCULAR; INTRAVENOUS at 21:32

## 2024-12-31 RX ADMIN — SODIUM CHLORIDE 500 ML: 9 INJECTION, SOLUTION INTRAVENOUS at 21:28

## 2024-12-31 RX ADMIN — SODIUM CHLORIDE 2 G: 1 TABLET ORAL at 21:57

## 2024-12-31 RX ADMIN — DICYCLOMINE HYDROCHLORIDE 20 MG: 10 CAPSULE ORAL at 21:32

## 2024-12-31 NOTE — TELEPHONE ENCOUNTER
Called patient, left VM informed her per Dr. May:    ----- Message from Oneil May sent at 12/31/2024  2:50 PM EST -----  Your Holter monitor is normal.    Requested patient call back with any questions/concerns.   
5

## 2025-01-01 NOTE — DISCHARGE INSTRUCTIONS
Call your doctor tomorrow for any medication changes that may be necessary secondary to the hyponatremia.  Return to the ER for concerns.

## 2025-01-01 NOTE — ED PROVIDER NOTES
"Subjective   History of Present Illness  Patient is alert and orientated. Patient states they had a nausea followed by vomit earlier today. Patient fainted in bathroom at restaurant and friend witnessed it. Friend of patient caught her falling to her knees. Patient states she has had an episode like this before in November.  Patient does report they doubled one of her medications in November that reportedly \"depletes sodium\".  Patient denies any chest pain or shortness of breath.  She states she just overall at this point feels \"fatigued\".    History provided by:  Patient and EMS personnel      Review of Systems    Past Medical History:   Diagnosis Date    Acid reflux     Anxiety     Chicken pox     Dizziness     Environmental allergies     Hyperlipidemia     Hypertension     Migraine     Stomach problems     Syncope November 8, 2024       No Known Allergies    Past Surgical History:   Procedure Laterality Date    GALLBLADDER SURGERY  2010    RETINAL DETACHMENT SURGERY  2009    VASCULAR SURGERY         Family History   Problem Relation Age of Onset    Hypertension Mother     Hyperlipidemia Mother     Migraines Sister     Hypertension Sister     Hyperlipidemia Sister     Seizures Maternal Aunt     Stroke Paternal Grandfather     Stroke Paternal Grandmother        Social History     Socioeconomic History    Marital status: Single   Tobacco Use    Smoking status: Never     Passive exposure: Never    Smokeless tobacco: Never   Vaping Use    Vaping status: Never Used   Substance and Sexual Activity    Alcohol use: Yes     Alcohol/week: 4.0 - 6.0 standard drinks of alcohol     Types: 2 - 3 Glasses of wine, 2 - 3 Cans of beer per week    Drug use: Never    Sexual activity: Yes     Partners: Male           Objective   Physical Exam  Vitals and nursing note reviewed.   Constitutional:       General: She is not in acute distress.     Appearance: Normal appearance. She is not toxic-appearing.   Cardiovascular:      Rate and " Rhythm: Normal rate and regular rhythm.   Pulmonary:      Effort: Pulmonary effort is normal.      Breath sounds: Normal breath sounds.   Musculoskeletal:         General: Normal range of motion.   Neurological:      General: No focal deficit present.      Mental Status: She is alert and oriented to person, place, and time.   Psychiatric:         Mood and Affect: Mood normal.         Behavior: Behavior normal.         Procedures           ED Course  ED Course as of 12/31/24 2200   Tue Dec 31, 2024   1953 Patient with prior history of similar.  I did review the stress test from 8 days ago.  That was negative with excellent exercise tolerance with no arrhythmias and otherwise negative stress test. [RS]   1956 I personally reviewed the admission.  Patient had alteration in mental status with slurred speech on November 8.  Patient was admitted as a stroke evaluation.  Negative evaluation at that time.  See that documentation for details [RS]   2117 Sodium(!): 125  Acute hyponatremia noted. [RS]   2124 Updated the patient on the findings and the plans.  The patient does not want to be admitted the hospital.  I think that is reasonable.  Will replace here.  I advised that she needs to call her doctor tomorrow about the medications to see what changes will be necessary secondary to the acute findings. [RS]   2150 Patient with acute hyponatremia associated with recent medication change.  We will replace some of the sodium here in the emergency department and continue treatment at home.  She is to call her doctor tomorrow for any alterations in medications may be indicated.  At this point, the patient does not want to be admitted to the hospital. I have reviewed results, considerations, and diagnosis with the patient and/or their representative. Anticipatory guidance provided. Follow-up plan reviewed. Precautions for acute return for re-evaluations also reviewed. This including potential for worsening of the presenting  condition and need for further evaluation, admission, and/or intervention as indicated. Opportunity to as questions provided. I advised them to return for any concerns and stressed the importance of timely follow-up and outpatient services. They verbalized understanding.   [RS]   2151 Note to patient: The 21st Century Cures Act makes medical notes like these available to patients in the interest of transparency. However, be advised this is a medical document. It is intended as peer to peer communication. It is written in medical language and may contain abbreviations or verbiage that are unfamiliar. It may appear blunt or direct. Medical documents are intended to carry relevant information, facts as evident, and the clinical opinion of the physician/NPP.   [RS]   2159 HS Troponin T: <6  Second troponin is negative [RS]      ED Course User Index  [RS] Alvin Avila MD                                                       Medical Decision Making  Problems Addressed:  Acute hyponatremia: complicated acute illness or injury  Syncope and collapse: complicated acute illness or injury    Amount and/or Complexity of Data Reviewed  Independent Historian: friend  External Data Reviewed: labs, radiology and notes.  Labs: ordered. Decision-making details documented in ED Course.  ECG/medicine tests: ordered.    Risk  OTC drugs.  Prescription drug management.  Decision regarding hospitalization.        Final diagnoses:   Syncope and collapse   Acute hyponatremia       ED Disposition  ED Disposition       ED Disposition   Discharge    Condition   Stable    Comment   --               Chilo Balderrama MD  [address] [place] [address] [place] [address] [phone]    Call in 1 day  As soon as possible.    [facility] EMERGENCY DEPARTMENT  [address] [facility] [address] [phone]    As needed, If symptoms worsen or ANY concerns.         Medication List        New Prescriptions       sodium chloride 1 g tablet  Take 1 tablet by mouth 3 (Three) Times a Day.               Where to Get Your Medications        These medications were sent to [facility] [address] [place] [address] - [address] [place] [address] - [phone]  - [phone]  [address] [place] [id number]      Phone: [phone]   sodium chloride 1 g tablet            Alvin Avila MD  12/31/24 0706       Alvin Avila MD  12/31/24 0091

## 2025-01-02 ENCOUNTER — TELEPHONE (OUTPATIENT)
Dept: NEUROLOGY | Facility: CLINIC | Age: 55
End: 2025-01-02
Payer: COMMERCIAL

## 2025-01-02 DIAGNOSIS — G43.C0 PERIODIC HEADACHE SYNDROME, NOT INTRACTABLE: Primary | ICD-10-CM

## 2025-01-02 LAB
QT INTERVAL: 440 MS
QTC INTERVAL: 453 MS

## 2025-01-02 RX ORDER — TOPIRAMATE 50 MG/1
TABLET, FILM COATED ORAL
Qty: 60 TABLET | Refills: 2 | Status: SHIPPED | OUTPATIENT
Start: 2025-01-02

## 2025-01-02 NOTE — TELEPHONE ENCOUNTER
Caller: Leandro Easleyesaida Leyva    Relationship: Self    Best call back number: [phone]      Which medication are you concerned about: TRILEPTAL    Who prescribed you this medication: MANSOOR BLANCO, JELLY, APRN    When did you start taking this medication: OVER 1 YEAR    What are your concerns: PT STATES AT HER LOV ON 11/15, MANSOOR DOUBLED HER DOSAGE ON TRILEPTAL, PT HAD ANOTHER PASSING OUT EPISODE 2 DAYS AGO, SEEN IN ER 12/31, ER NOTED BP WAS LOW AND SODIUM WAS LOW, PT WAS GIVEN RX FOR SODIUM CHLORIDE 1 G TABLET 3 TIMES DAILY X 7 DAYS, PT INDICATES SHE WOULD LIKE TO KNOW IF MANSOOR WANTS TO ADJUST THE TRILEPTAL DOSAGE, STOP TRILEPTAL OR IF SHE RECOMMENDS SOMETHING ELSE THAT WILL NOT LOWER HER SODIUM.    How long have you had these concerns: 11/15/24      PLEASE REVIEW AND ADVISE.

## 2025-01-02 NOTE — TELEPHONE ENCOUNTER
Called patient and gave provider's note.    Called patient and gave provider's note.  Patient wanted to know when she will need to have labs for sodium.

## 2025-01-02 NOTE — TELEPHONE ENCOUNTER
Have her decrease trileptal to 1 pill daily for one week then stop. I will send in topamax. She can start that now as she is weaning off of trileptal.

## 2025-02-17 ENCOUNTER — OFFICE VISIT (OUTPATIENT)
Dept: NEUROLOGY | Facility: CLINIC | Age: 55
End: 2025-02-17
Payer: COMMERCIAL

## 2025-02-17 VITALS
SYSTOLIC BLOOD PRESSURE: 118 MMHG | OXYGEN SATURATION: 99 % | HEART RATE: 72 BPM | WEIGHT: 171.4 LBS | DIASTOLIC BLOOD PRESSURE: 74 MMHG | BODY MASS INDEX: 25.98 KG/M2 | HEIGHT: 68 IN

## 2025-02-17 DIAGNOSIS — R55 SYNCOPE AND COLLAPSE: ICD-10-CM

## 2025-02-17 DIAGNOSIS — G43.C0 PERIODIC HEADACHE SYNDROME, NOT INTRACTABLE: Primary | ICD-10-CM

## 2025-02-17 PROCEDURE — 99214 OFFICE O/P EST MOD 30 MIN: CPT | Performed by: NURSE PRACTITIONER

## 2025-02-17 RX ORDER — TOPIRAMATE 50 MG/1
50 TABLET, FILM COATED ORAL 2 TIMES DAILY
Qty: 60 TABLET | Refills: 5 | Status: SHIPPED | OUTPATIENT
Start: 2025-02-17

## 2025-02-17 NOTE — LETTER
2025     Chilo Balderrama MD  [address] [place] [address]    Patient: Hiral Easley   YOB: 1970   Date of Visit: 2025     Dear Chilo Balderrama MD:       Thank you for referring Hiral Easley to me for evaluation. Below are the relevant portions of my assessment and plan of care.    If you have questions, please do not hesitate to call me. I look forward to following Hiral along with you.         Sincerely,        Anna Pretty DNP, APRN        CC: No Recipients    Anna Pretty DNP, APRN  25 [phone]  Signed     Neuro Office Visit      Encounter Date: 2025   Patient Name: Hiral Easley  : 1970   MRN: [id number]   PCP: Dr Balderrama  Chief Complaint:    Chief Complaint   Patient presents with   • Headache       History of Present Illness: Hiral Easley is a 54 y.o. female who is here today in Neurology for  periodic headache syndrome, elevated bp, bradycardia,  near syncope    Last visit 11/15/2024-[employer] oxc 300 bid, record bp and pulse, refer to card    Interval hx: wean off of trileptia and start tpm-low sodium and syncope  History of Present Illness     HTN/Syncopal episode  Pt had a second episode of syncope 24 while in a restaurant bathroom. No seizure activity. Seen in ED. Sodium and potassium low. Stopped OXC. Now on TPM  H&P by Kerley, Brian Joseph, DO (2024 16:56) -presented with blurred vision, right facial droop and dysarthria. Transferred to [facility]  Her blood pressure today was recorded as 140 systolic. Last weekend, she was hospitalized following a collapse on Friday morning, during which her blood pressure was significantly low at 90 systolic. The hospital advised her to discontinue her diurtetic and anti-hypertensives, and monitor her blood pressure. Her blood pressure remained normal until last night when it spiked, prompting her to take her medication again.     She often feels  fatigued and experiences intermittent dizziness.         HA  Headaches are controlled on TPM. OXC caused hyponatremia. Denies history of renal stones. Has mild fingertip paresthesia.     Ubrelvy is effective in 30-60 min with no side effects     Freq: can go weeks without a headache. 4 headaches in last 3 months  Location: retro-orbital and right temple  Quality: dull ache  Duration: Ubrelvy is effective in 30 min  Severity:3/10  Triggers: stress, onion  Assoc sx: Nausea, vomiting, photophobia/phonophobia, dizziness, no tinnitus  Aura:tingling and ear pain  Visual Changes     Abortives: Ubrelvy, imitrex, sumatriptan, tylenol, excedrin, ibuprofen, toradol  Preventives:[facility]     Medical records release PCP to send future labs for sodium     HTN  BP running low.  Stopped lisinopril amlodipine and hctz.     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  Subjective      Past Medical History:   Past Medical History:   Diagnosis Date   • Acid reflux    • Anxiety    • Chicken pox    • Dizziness    • Environmental allergies    • Hyperlipidemia    • Hypertension    • Migraine    • Stomach problems    • Syncope November 8, 2024       Past Surgical History:   Past Surgical History:   Procedure Laterality Date   • GALLBLADDER SURGERY  2010   • RETINAL DETACHMENT SURGERY  2009   • VASCULAR SURGERY         Family History:   Family History   Problem Relation Age of Onset   • Hypertension Mother    • Hyperlipidemia Mother    • Migraines Sister    • Hypertension Sister    • Hyperlipidemia Sister    • Seizures Maternal Aunt    • Stroke Paternal Grandfather    • Stroke Paternal Grandmother        Social History:   Social History     Socioeconomic History   • Marital status: Single   Tobacco Use   • Smoking status: Never     Passive exposure: Never   • Smokeless tobacco: Never   Vaping Use   • Vaping status: Never Used   Substance and Sexual Activity   • Alcohol use: Yes     Alcohol/week: 4.0 - 6.0 standard  drinks of alcohol     Types: 2 - 3 Glasses of wine, 2 - 3 Cans of beer per week   • Drug use: Never   • Sexual activity: Yes     Partners: Male       Medications:     Current Outpatient Medications:   •  albuterol sulfate  (90 Base) MCG/ACT inhaler, , Disp: , Rfl:   •  atorvastatin (LIPITOR) 20 MG tablet, Take 1 tablet by mouth Daily., Disp: , Rfl:   •  fluticasone (FLONASE) 50 MCG/ACT nasal spray, Administer 2 sprays into the nostril(s) as directed by provider As Needed for Rhinitis., Disp: , Rfl:   •  montelukast (SINGULAIR) 10 MG tablet, Take 1 tablet by mouth Every Night., Disp: , Rfl:   •  Rimegepant Sulfate (Nurtec) 75 MG tablet dispersible tablet, Take 1 tablet by mouth Daily As Needed (at onset of headache). Max of 75 mg/24 hours, Max of 18 tabs/30 days., Disp: 16 tablet, Rfl: 11  •  topiramate (Topamax) 50 MG tablet, Take 1 tablet by mouth 2 (Two) Times a Day., Disp: 60 tablet, Rfl: 5    Allergies:   No Known Allergies    PHQ-9 Total Score:     STEADI Fall Risk Assessment has not been completed.    Objective     Physical Exam:   Physical Exam  Constitutional:       General: She is not in acute distress.     Appearance: Normal appearance. She is not ill-appearing or toxic-appearing.   HENT:      Head: Normocephalic and atraumatic.      Nose: Nose normal.   Eyes:      General:         Right eye: No discharge.         Left eye: No discharge.      Conjunctiva/sclera: Conjunctivae normal.   Pulmonary:      Effort: Pulmonary effort is normal. No respiratory distress.   Neurological:      General: No focal deficit present.      Mental Status: She is alert and oriented to person, place, and time. Mental status is at baseline.   Psychiatric:         Mood and Affect: Mood normal.         Behavior: Behavior normal.         Thought Content: Thought content normal.         Judgment: Judgment normal.         Neurological Exam  Mental Status  Alert. Oriented to person, place, and time.     Physical  "Exam        Vital Signs:   Vitals:    02/17/25 1454   BP: 118/74   Pulse: 72   SpO2: 99%   Weight: 77.7 kg (171 lb 6.4 oz)   Height: 172.7 cm (67.99\")     Body mass index is 26.07 kg/m².         Assessment / Plan      Assessment/Plan:   Diagnoses and all orders for this visit:    1. Periodic headache syndrome, not intractable (Primary)  Comments:  Cont TPM  Orders:  -     topiramate (Topamax) 50 MG tablet; Take 1 tablet by mouth 2 (Two) Times a Day.  Dispense: 60 tablet; Refill: 5    2. Syncope and collapse  Comments:  Stop OXC due to hyponatremia. FU w cardiology. If she has another episode of syncope check EEG       Assessment & Plan          Patient Education:       Reviewed medications, potential side effects and signs and symptoms to report. Discussed risk versus benefits of treatment plan with patient and/or family-including medications, labs and radiology that may be ordered. Addressed questions and concerns during visit. Patient and/or family verbalized understanding and agree with plan. Instructed to call the office with any questions and report to ER with any life-threatening symptoms.     Follow Up:   Return in about 6 months (around 8/17/2025).    During this visit the following were done:  Labs Reviewed [x]    Labs Ordered []    Radiology Reports Reviewed [x]    Radiology Ordered []    PCP Records Reviewed []    Referring Provider Records Reviewed []    ER Records Reviewed [x]    Hospital Records Reviewed []    History Obtained From Family []    Radiology Images Reviewed []    Other Reviewed []    Records Requested []      Patient or patient representative verbalized consent for the use of Ambient Listening during the visit with  Anna Pretty DNP, APRN for chart documentation. 2/17/2025  08:53 EST      Anna Pretty DNP, APRN  "

## 2025-02-17 NOTE — PROGRESS NOTES
Neuro Office Visit      Encounter Date: 2025   Patient Name: Hiral Easley  : 1970   MRN: [id number]   PCP: Dr Balderrama  Chief Complaint:    Chief Complaint   Patient presents with    Headache       History of Present Illness: Hiral Easley is a 54 y.o. female who is here today in Neurology for  periodic headache syndrome, elevated bp, bradycardia,  near syncope    Last visit 11/15/2024-[employer] oxc 300 bid, record bp and pulse, refer to card    Interval hx: wean off of trileptia and start tpm-low sodium and syncope  History of Present Illness     HTN/Syncopal episode  Pt had a second episode of syncope 24 while in a restaurant bathroom. No seizure activity. Seen in ED. Sodium and potassium low. Stopped OXC. Now on TPM  H&P by Kerley, Brian Joseph, DO (2024 16:56) -presented with blurred vision, right facial droop and dysarthria. Transferred to [facility]  Her blood pressure today was recorded as 140 systolic. Last weekend, she was hospitalized following a collapse on Friday morning, during which her blood pressure was significantly low at 90 systolic. The hospital advised her to discontinue her diurtetic and anti-hypertensives, and monitor her blood pressure. Her blood pressure remained normal until last night when it spiked, prompting her to take her medication again.     She often feels fatigued and experiences intermittent dizziness.         HA  Headaches are controlled on TPM. OXC caused hyponatremia. Denies history of renal stones. Has mild fingertip paresthesia.     Ubrelvy is effective in 30-60 min with no side effects     Freq: can go weeks without a headache. 4 headaches in last 3 months  Location: retro-orbital and right temple  Quality: dull ache  Duration: Ubrelvy is effective in 30 min  Severity:3/10  Triggers: stress, onion  Assoc sx: Nausea, vomiting, photophobia/phonophobia, dizziness, no tinnitus  Aura:tingling and ear pain  Visual Changes     Abortives: Ubrelvy,  imitrex, sumatriptan, tylenol, excedrin, ibuprofen, toradol  Preventives:[facility]     Medical records release PCP to send future labs for sodium     HTN  BP running low.  Stopped lisinopril amlodipine and hctz.     PMH: htn, ocular migraines, hyponatremia, syncope    SH: works in law office.  Subjective      Past Medical History:   Past Medical History:   Diagnosis Date    Acid reflux     Anxiety     Chicken pox     Dizziness     Environmental allergies     Hyperlipidemia     Hypertension     Migraine     Stomach problems     Syncope November 8, 2024       Past Surgical History:   Past Surgical History:   Procedure Laterality Date    GALLBLADDER SURGERY  2010    RETINAL DETACHMENT SURGERY  2009    VASCULAR SURGERY         Family History:   Family History   Problem Relation Age of Onset    Hypertension Mother     Hyperlipidemia Mother     Migraines Sister     Hypertension Sister     Hyperlipidemia Sister     Seizures Maternal Aunt     Stroke Paternal Grandfather     Stroke Paternal Grandmother        Social History:   Social History     Socioeconomic History    Marital status: Single   Tobacco Use    Smoking status: Never     Passive exposure: Never    Smokeless tobacco: Never   Vaping Use    Vaping status: Never Used   Substance and Sexual Activity    Alcohol use: Yes     Alcohol/week: 4.0 - 6.0 standard drinks of alcohol     Types: 2 - 3 Glasses of wine, 2 - 3 Cans of beer per week    Drug use: Never    Sexual activity: Yes     Partners: Male       Medications:     Current Outpatient Medications:     albuterol sulfate  (90 Base) MCG/ACT inhaler, , Disp: , Rfl:     atorvastatin (LIPITOR) 20 MG tablet, Take 1 tablet by mouth Daily., Disp: , Rfl:     fluticasone (FLONASE) 50 MCG/ACT nasal spray, Administer 2 sprays into the nostril(s) as directed by provider As Needed for Rhinitis., Disp: , Rfl:     montelukast (SINGULAIR) 10 MG tablet, Take 1 tablet by mouth Every Night., Disp: , Rfl:     Rimegepant Sulfate  "(Nurtec) 75 MG tablet dispersible tablet, Take 1 tablet by mouth Daily As Needed (at onset of headache). Max of 75 mg/24 hours, Max of 18 tabs/30 days., Disp: 16 tablet, Rfl: 11    topiramate (Topamax) 50 MG tablet, Take 1 tablet by mouth 2 (Two) Times a Day., Disp: 60 tablet, Rfl: 5    Allergies:   No Known Allergies    PHQ-9 Total Score:     STEADI Fall Risk Assessment has not been completed.    Objective     Physical Exam:   Physical Exam  Constitutional:       General: She is not in acute distress.     Appearance: Normal appearance. She is not ill-appearing or toxic-appearing.   HENT:      Head: Normocephalic and atraumatic.      Nose: Nose normal.   Eyes:      General:         Right eye: No discharge.         Left eye: No discharge.      Conjunctiva/sclera: Conjunctivae normal.   Pulmonary:      Effort: Pulmonary effort is normal. No respiratory distress.   Neurological:      General: No focal deficit present.      Mental Status: She is alert and oriented to person, place, and time. Mental status is at baseline.   Psychiatric:         Mood and Affect: Mood normal.         Behavior: Behavior normal.         Thought Content: Thought content normal.         Judgment: Judgment normal.         Neurological Exam  Mental Status  Alert. Oriented to person, place, and time.     Physical Exam        Vital Signs:   Vitals:    02/17/25 1454   BP: 118/74   Pulse: 72   SpO2: 99%   Weight: 77.7 kg (171 lb 6.4 oz)   Height: 172.7 cm (67.99\")     Body mass index is 26.07 kg/m².         Assessment / Plan      Assessment/Plan:   Diagnoses and all orders for this visit:    1. Periodic headache syndrome, not intractable (Primary)  Comments:  Cont TPM  Orders:  -     topiramate (Topamax) 50 MG tablet; Take 1 tablet by mouth 2 (Two) Times a Day.  Dispense: 60 tablet; Refill: 5    2. Syncope and collapse  Comments:  Stop OXC due to hyponatremia. [facility]. If she has another episode of syncope check EEG       Assessment & " Plan          Patient Education:       Reviewed medications, potential side effects and signs and symptoms to report. Discussed risk versus benefits of treatment plan with patient and/or family-including medications, labs and radiology that may be ordered. Addressed questions and concerns during visit. Patient and/or family verbalized understanding and agree with plan. Instructed to call the office with any questions and report to ER with any life-threatening symptoms.     Follow Up:   Return in about 6 months (around 8/17/2025).    During this visit the following were done:  Labs Reviewed [x]    Labs Ordered []    Radiology Reports Reviewed [x]    Radiology Ordered []    PCP Records Reviewed []    Referring Provider Records Reviewed []    ER Records Reviewed [x]    Hospital Records Reviewed []    History Obtained From Family []    Radiology Images Reviewed []    Other Reviewed []    Records Requested []      Patient or patient representative verbalized consent for the use of Ambient Listening during the visit with  Anna Pretty DNP, APRN for chart documentation. 2/17/2025  08:53 EST      Anna Pretty DNP, APRN

## 2025-02-19 ENCOUNTER — TELEPHONE (OUTPATIENT)
Dept: CARDIOLOGY | Facility: CLINIC | Age: 55
End: 2025-02-19

## 2025-02-19 NOTE — TELEPHONE ENCOUNTER
Caller: Hiral Easley     Relationship: SELF    Best call back number: [phone]    What is your medical concern? PT FAINTED ON 13.31.24. SINCE THEN, SHE HAS BEEN GETTING LIGHTHEADED AND NAUSEOUS. SHE HAS BEEN GETTING LOW BLOOD PRESSURE READINGS OF 85/67. HER PCP TOOK HER OFF ALL WATER PILLS AND BLOOD PRESSURE MEDICATION. SHE WAS SPEAKING TO ANOTHER PHYSICIAN AND THEY SUGGESTED SHE CALL CARDIOLOGY. PLEASE REACH OUT TO PT TO ADDRESS THIS.    How long has this issue been going on? 12.31.25

## 2025-03-03 NOTE — PROGRESS NOTES
Follow-up Visit      Date: 2025  Patient Name: Hiral Easley  : 1970   MRN: [id number]     Chief Complaint:    Chief Complaint   Patient presents with    Blood Pressure Issues        History of Present Illness: Hiral Easley is a 55 y.o. female who is here today for follow-up on her passing out.    Patient has another episode in which she suddenly passed out and all the workup has been negative.  Her stress test and echocardiogram did not reveal any significant disease.  She usually gets up in the morning and feels like her blood pressure is low and also in the afternoon it gets a little better.  At night when her blood pressure is 130 she starting getting some headaches and tingly feeling in the scalp.  She also sometimes feel pressure-like feeling in her chest.  She denies any significant chest pain or any lower extremity edema.      Problem List     CARDIAC  Coronary Artery Disease:   The 10-year ASCVD risk score (Rogelio POTTER, et al., 2019) is: 2%  Exercise stress test, 2024: Normal    Myocardium:   Echo, 2024: LVEF 60 %     Valvular:   No known valvular disease     Electrical:   NSR      Pericardium:   Normal     CARDIAC RISK FACTORS  Hypertension  Dyslipidemia  Menopausal state - Post-menopausal    NON-CARDIAC  Vericose vein    SURGERIES  Cholecystectomy  Retinal detachment surgery  Varicose vein surgery        Subjective      Review of Systems:   Review of Systems   Respiratory: Negative.     Cardiovascular: Negative.        Medications:     Current Outpatient Medications:     albuterol sulfate  (90 Base) MCG/ACT inhaler, , Disp: , Rfl:     atorvastatin (LIPITOR) 20 MG tablet, Take 1 tablet by mouth Daily., Disp: , Rfl:     fluticasone (FLONASE) 50 MCG/ACT nasal spray, Administer 2 sprays into the nostril(s) as directed by provider As Needed for Rhinitis., Disp: , Rfl:     montelukast (SINGULAIR) 10 MG tablet, Take 1 tablet by mouth Every Night., Disp: , Rfl:      "Rimegepant Sulfate (Nurtec) 75 MG tablet dispersible tablet, Take 1 tablet by mouth Daily As Needed (at onset of headache). Max of 75 mg/24 hours, Max of 18 tabs/30 days., Disp: 16 tablet, Rfl: 11    topiramate (Topamax) 50 MG tablet, Take 1 tablet by mouth 2 (Two) Times a Day., Disp: 60 tablet, Rfl: 5    Allergies:   No Known Allergies    Objective     Physical Exam:  Vitals:    03/04/25 1358   BP: 132/88   BP Location: Right arm   Patient Position: Sitting   Cuff Size: Adult   Pulse: 66   SpO2: 100%   Weight: 73.9 kg (163 lb)   Height: 172.7 cm (68\")     Body mass index is 24.78 kg/m².    Constitutional:       General: Not in acute distress.     Appearance: Healthy appearance. Not in distress.     Neck:     JVP:Not elevated     Carotid artery: Normal    Pulmonary:      Effort: Pulmonary effort is normal.      Breath sounds: Normal breath sounds. No wheezing. No rhonchi. No rales.     Cardiovascular:      Normal rate. Regular rhythm. Normal S1. Normal S2.      Murmurs: There is no significant murmur.      No gallop. No click. No rub.     Abdominal:      General: Bowel sounds are normal.      Palpations: Abdomen is soft.      Tenderness: There is no abdominal tenderness.    Extremities:     Pulses:Normal radial and pedal pulses     Edema:no edema    Smoking Cessation:   Tobacco Product History : Patient never smoked    Lab Review:   Lab Results   Component Value Date    GLUCOSE 136 (H) 12/31/2024    BUN 12 12/31/2024    CREATININE 0.65 12/31/2024    BCR 18.5 12/31/2024    K 3.2 (L) 12/31/2024    CO2 23.0 12/31/2024    CALCIUM 9.1 12/31/2024    ALBUMIN 4.0 12/31/2024    AST 19 12/31/2024    ALT 20 12/31/2024     Lab Results   Component Value Date    WBC 5.36 12/31/2024    HGB 11.1 (L) 12/31/2024    HCT 33.3 (L) 12/31/2024    MCV 84.3 12/31/2024     12/31/2024         Assessment / Plan      Assessment:   Diagnosis Plan   1. Dysautonomia orthostatic hypotension syndrome  Tilt Table      2. Mixed hyperlipidemia   "           Plan:  Seems like most of her symptoms are related to dysautonomia.  We will go ahead and schedule her for tilt table test.  Her echo did not reveal any significant symptom of pulmonary hypertension.  Her hemoglobin is running a little low we will get a tilt table and if she is still having symptoms we might get her iron stores check also to see if that is causing her symptoms.      Follow Up:       Return in about 6 months (around 9/4/2025).    Oneil May MD

## 2025-03-04 ENCOUNTER — OFFICE VISIT (OUTPATIENT)
Dept: CARDIOLOGY | Facility: CLINIC | Age: 55
End: 2025-03-04
Payer: COMMERCIAL

## 2025-03-04 VITALS
HEIGHT: 68 IN | SYSTOLIC BLOOD PRESSURE: 132 MMHG | OXYGEN SATURATION: 100 % | BODY MASS INDEX: 24.71 KG/M2 | WEIGHT: 163 LBS | DIASTOLIC BLOOD PRESSURE: 88 MMHG | HEART RATE: 66 BPM

## 2025-03-04 DIAGNOSIS — I95.1 DYSAUTONOMIA ORTHOSTATIC HYPOTENSION SYNDROME: Primary | ICD-10-CM

## 2025-03-04 DIAGNOSIS — E78.2 MIXED HYPERLIPIDEMIA: ICD-10-CM

## 2025-03-04 PROCEDURE — 99214 OFFICE O/P EST MOD 30 MIN: CPT | Performed by: INTERNAL MEDICINE

## 2025-04-17 ENCOUNTER — HOSPITAL ENCOUNTER (OUTPATIENT)
Dept: CARDIOLOGY | Facility: HOSPITAL | Age: 55
Discharge: HOME | End: 2025-04-17
Admitting: INTERNAL MEDICINE
Payer: COMMERCIAL

## 2025-04-17 DIAGNOSIS — I95.1 DYSAUTONOMIA ORTHOSTATIC HYPOTENSION SYNDROME: ICD-10-CM

## 2025-04-17 PROCEDURE — 93660 TILT TABLE EVALUATION: CPT

## 2025-04-18 ENCOUNTER — RESULTS FOLLOW-UP (OUTPATIENT)
Dept: CARDIOLOGY | Facility: CLINIC | Age: 55
End: 2025-04-18
Payer: COMMERCIAL

## 2025-08-18 ENCOUNTER — OFFICE VISIT (OUTPATIENT)
Facility: HOSPITAL | Age: 55
End: 2025-08-18
Payer: COMMERCIAL

## 2025-08-18 VITALS
DIASTOLIC BLOOD PRESSURE: 82 MMHG | HEART RATE: 64 BPM | OXYGEN SATURATION: 98 % | HEIGHT: 68 IN | WEIGHT: 163.6 LBS | SYSTOLIC BLOOD PRESSURE: 116 MMHG | BODY MASS INDEX: 24.8 KG/M2

## 2025-08-18 DIAGNOSIS — G43.C0 PERIODIC HEADACHE SYNDROME, NOT INTRACTABLE: Primary | ICD-10-CM

## 2025-08-18 PROCEDURE — 99213 OFFICE O/P EST LOW 20 MIN: CPT | Performed by: NURSE PRACTITIONER

## 2025-08-18 PROCEDURE — G0463 HOSPITAL OUTPT CLINIC VISIT: HCPCS | Performed by: NURSE PRACTITIONER

## 2025-08-18 RX ORDER — TOPIRAMATE 50 MG/1
50 TABLET, FILM COATED ORAL 2 TIMES DAILY
Qty: 180 TABLET | Refills: 3 | Status: SHIPPED | OUTPATIENT
Start: 2025-08-18